# Patient Record
Sex: FEMALE | Race: WHITE | NOT HISPANIC OR LATINO
[De-identification: names, ages, dates, MRNs, and addresses within clinical notes are randomized per-mention and may not be internally consistent; named-entity substitution may affect disease eponyms.]

---
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
## 2018-03-08 ENCOUNTER — OTHER (OUTPATIENT)
Age: 18
End: 2018-03-08

## 2018-03-12 ENCOUNTER — OTHER (OUTPATIENT)
Age: 18
End: 2018-03-12

## 2018-03-19 ENCOUNTER — MEDICATION RENEWAL (OUTPATIENT)
Age: 18
End: 2018-03-19

## 2018-03-23 ENCOUNTER — APPOINTMENT (OUTPATIENT)
Dept: ENDOCRINOLOGY | Facility: CLINIC | Age: 18
End: 2018-03-23

## 2018-03-23 ENCOUNTER — OTHER (OUTPATIENT)
Age: 18
End: 2018-03-23

## 2018-06-12 ENCOUNTER — APPOINTMENT (OUTPATIENT)
Dept: PEDIATRICS | Facility: CLINIC | Age: 18
End: 2018-06-12
Payer: COMMERCIAL

## 2018-06-12 PROCEDURE — 86580 TB INTRADERMAL TEST: CPT

## 2018-08-07 ENCOUNTER — MEDICATION RENEWAL (OUTPATIENT)
Age: 18
End: 2018-08-07

## 2018-09-04 ENCOUNTER — MEDICATION RENEWAL (OUTPATIENT)
Age: 18
End: 2018-09-04

## 2018-09-04 RX ORDER — MOMETASONE FUROATE AND FORMOTEROL FUMARATE DIHYDRATE 200; 5 UG/1; UG/1
200-5 AEROSOL RESPIRATORY (INHALATION)
Qty: 1 | Refills: 3 | Status: DISCONTINUED | COMMUNITY
Start: 2017-12-28 | End: 2018-09-04

## 2018-09-05 ENCOUNTER — MEDICATION RENEWAL (OUTPATIENT)
Age: 18
End: 2018-09-05

## 2018-09-13 ENCOUNTER — MEDICATION RENEWAL (OUTPATIENT)
Age: 18
End: 2018-09-13

## 2018-12-17 ENCOUNTER — APPOINTMENT (OUTPATIENT)
Dept: PEDIATRIC PULMONARY CYSTIC FIB | Facility: CLINIC | Age: 18
End: 2018-12-17
Payer: COMMERCIAL

## 2018-12-17 ENCOUNTER — LABORATORY RESULT (OUTPATIENT)
Age: 18
End: 2018-12-17

## 2018-12-17 VITALS
TEMPERATURE: 98.1 F | BODY MASS INDEX: 21.34 KG/M2 | WEIGHT: 125 LBS | OXYGEN SATURATION: 98 % | HEART RATE: 77 BPM | HEIGHT: 64.37 IN | DIASTOLIC BLOOD PRESSURE: 73 MMHG | RESPIRATION RATE: 24 BRPM | SYSTOLIC BLOOD PRESSURE: 123 MMHG

## 2018-12-17 LAB
FERRITIN SERPL-MCNC: 55 NG/ML
TSH SERPL-ACNC: 4.15 UIU/ML

## 2018-12-17 PROCEDURE — 99214 OFFICE O/P EST MOD 30 MIN: CPT | Mod: 25

## 2018-12-17 PROCEDURE — 94010 BREATHING CAPACITY TEST: CPT

## 2018-12-17 NOTE — PHYSICAL EXAM
[Well Nourished] : well nourished [Well Developed] : well developed [Alert] : ~L alert [Active] : active [Normal Breathing Pattern] : normal breathing pattern [No Respiratory Distress] : no respiratory distress [No Allergic Shiners] : no allergic shiners [No Drainage] : no drainage [No Conjunctivitis] : no conjunctivitis [Tympanic Membranes Clear] : tympanic membranes were clear [Nasal Mucosa Non-Edematous] : nasal mucosa non-edematous [No Nasal Drainage] : no nasal drainage [No Polyps] : no polyps [No Sinus Tenderness] : no sinus tenderness [No Oral Pallor] : no oral pallor [No Oral Cyanosis] : no oral cyanosis [Non-Erythematous] : non-erythematous [No Exudates] : no exudates [No Postnasal Drip] : no postnasal drip [No Tonsillar Enlargement] : no tonsillar enlargement [Absence Of Retractions] : absence of retractions [Symmetric] : symmetric [Good Expansion] : good expansion [No Acc Muscle Use] : no accessory muscle use [Good aeration to bases] : good aeration to bases [Equal Breath Sounds] : equal breath sounds bilaterally [No Crackles] : no crackles [No Rhonchi] : no rhonchi [No Wheezing] : no wheezing [Normal Sinus Rhythm] : normal sinus rhythm [No Heart Murmur] : no heart murmur [Soft, Non-Tender] : soft, non-tender [No Hepatosplenomegaly] : no hepatosplenomegaly [Non Distended] : was not ~L distended [Abdomen Mass (___ Cm)] : no abdominal mass palpated [Full ROM] : full range of motion [No Clubbing] : no clubbing [Capillary Refill < 2 secs] : capillary refill less than two seconds [No Cyanosis] : no cyanosis [No Petechiae] : no petechiae [No Kyphoscoliosis] : no kyphoscoliosis [No Contractures] : no contractures [Alert and  Oriented] : alert and oriented [No Abnormal Focal Findings] : no abnormal focal findings [Normal Muscle Tone And Reflexes] : normal muscle tone and reflexes [No Birth Marks] : no birth marks [No Rashes] : no rashes [No Skin Lesions] : no skin lesions [FreeTextEntry4] : edematous erythematous nasal turbinates

## 2018-12-17 NOTE — REVIEW OF SYSTEMS
[NI] : Allergic [Nl] : Endocrine [Fatigue] : fatigue [Snoring] : snoring [Nasal Congestion] : nasal congestion [Immunizations are up to date] : Immunizations are up to date [Influenza Vaccine this Past Year] : Influenza vaccine this past year [FreeTextEntry3] : wears glasses [FreeTextEntry4] : colds are worse but occur every other month in the winter [FreeTextEntry6] : prolonged cough with cold [de-identified] : acne [FreeTextEntry1] : flu vax [id number]

## 2018-12-17 NOTE — HISTORY OF PRESENT ILLNESS
[Worsened] : have worsened [FreeTextEntry1] : Having more difficulty with running, hard workouts and long distances. \par Taking albuterol and atrovent prior to workouts seems to help. \par This is first full fall season patient has been running (injured in past years).\par Taking Advair - reluctant to try Dulera,. \par No antbiotics, no prednisone. \par \par \par One course of prednisone, one z pack. \par Taking zyrtec, flonase, montelukast.  No sinus rinse since spring. OTC eye drops helped with itchiness. \par Sleeping ok. \par Still on Advair - on lower dose. A little wheezier with long runs, but ok by and large. \par No ocughing at night. \par Anemic - hard to differentiate fatigue from shortness of breath sometimes. \par \par \par

## 2018-12-17 NOTE — END OF VISIT
[FreeTextEntry2] : I, Genny Edge MS RN  have acted as a scribe and documented the HPI information for Dr. Becker\par The HPI documentation completed by the scribe is an accurate record of both my words and actions. \par

## 2018-12-19 LAB — FREE T4-ESOTERIX: 1.21 NG/DL

## 2018-12-27 LAB
A ALTERNATA IGE QN: 8.38 KUA/L
A FUMIGATUS IGE QN: 0.77 KUA/L
C ALBICANS IGE QN: 0.69 KUA/L
C HERBARUM IGE QN: 0.35 KUA/L
CAT DANDER IGE QN: 2.97 KUA/L
COMMON RAGWEED IGE QN: 0.79 KUA/L
D FARINAE IGE QN: <0.1 KUA/L
D PTERONYSS IGE QN: <0.1 KUA/L
DEPRECATED A ALTERNATA IGE RAST QL: ABNORMAL
DEPRECATED A FUMIGATUS IGE RAST QL: ABNORMAL
DEPRECATED C ALBICANS IGE RAST QL: ABNORMAL
DEPRECATED C HERBARUM IGE RAST QL: ABNORMAL
DEPRECATED CAT DANDER IGE RAST QL: ABNORMAL
DEPRECATED COMMON RAGWEED IGE RAST QL: ABNORMAL
DEPRECATED D FARINAE IGE RAST QL: 0
DEPRECATED D PTERONYSS IGE RAST QL: 0
DEPRECATED DOG DANDER IGE RAST QL: NORMAL
DEPRECATED M RACEMOSUS IGE RAST QL: 0
DEPRECATED ROACH IGE RAST QL: 0
DEPRECATED TIMOTHY IGE RAST QL: ABNORMAL
DEPRECATED WHITE OAK IGE RAST QL: ABNORMAL
DOG DANDER IGE QN: 0.21 KUA/L
M RACEMOSUS IGE QN: <0.1 KUA/L
ROACH IGE QN: <0.1 KUA/L
TIMOTHY IGE QN: 3.43 KUA/L
WHITE OAK IGE QN: 21.7 KUA/L

## 2019-02-11 ENCOUNTER — APPOINTMENT (OUTPATIENT)
Dept: PULMONOLOGY | Facility: CLINIC | Age: 19
End: 2019-02-11

## 2019-04-30 ENCOUNTER — TRANSCRIPTION ENCOUNTER (OUTPATIENT)
Age: 19
End: 2019-04-30

## 2020-10-11 ENCOUNTER — APPOINTMENT (OUTPATIENT)
Dept: DISASTER EMERGENCY | Facility: CLINIC | Age: 20
End: 2020-10-11

## 2020-10-14 ENCOUNTER — APPOINTMENT (OUTPATIENT)
Dept: PULMONOLOGY | Facility: CLINIC | Age: 20
End: 2020-10-14
Payer: COMMERCIAL

## 2020-10-14 VITALS — DIASTOLIC BLOOD PRESSURE: 75 MMHG | HEART RATE: 63 BPM | RESPIRATION RATE: 16 BRPM | SYSTOLIC BLOOD PRESSURE: 118 MMHG

## 2020-10-14 VITALS
WEIGHT: 121 LBS | HEART RATE: 68 BPM | BODY MASS INDEX: 20.66 KG/M2 | HEIGHT: 64.3 IN | OXYGEN SATURATION: 98 % | TEMPERATURE: 98.2 F

## 2020-10-14 PROCEDURE — ZZZZZ: CPT

## 2020-10-14 PROCEDURE — 94726 PLETHYSMOGRAPHY LUNG VOLUMES: CPT

## 2020-10-14 PROCEDURE — 94060 EVALUATION OF WHEEZING: CPT

## 2020-10-14 PROCEDURE — 99203 OFFICE O/P NEW LOW 30 MIN: CPT | Mod: 25

## 2020-10-14 PROCEDURE — 94729 DIFFUSING CAPACITY: CPT

## 2020-10-14 RX ORDER — IPRATROPIUM BROMIDE AND ALBUTEROL 20; 100 UG/1; UG/1
20-100 SPRAY, METERED RESPIRATORY (INHALATION) TWICE DAILY
Qty: 1 | Refills: 5 | Status: ACTIVE | COMMUNITY
Start: 2020-10-14 | End: 1900-01-01

## 2020-10-14 NOTE — PHYSICAL EXAM
[No Acute Distress] : no acute distress [Normal Oropharynx] : normal oropharynx [Normal Appearance] : normal appearance [III] : Mallampati Class: III [No Neck Mass] : no neck mass [Normal S1, S2] : normal s1, s2 [Normal Rate/Rhythm] : normal rate/rhythm [Clear to Auscultation Bilaterally] : clear to auscultation bilaterally [No Resp Distress] : no resp distress [Benign] : benign [No Abnormalities] : no abnormalities [Normal Gait] : normal gait [No Clubbing] : no clubbing [No Edema] : no edema [Normal Color/ Pigmentation] : normal color/ pigmentation [No Focal Deficits] : no focal deficits [Oriented x3] : oriented x3

## 2020-10-15 RX ORDER — VORTIOXETINE 10 MG/1
10 TABLET, FILM COATED ORAL DAILY
Qty: 1 | Refills: 0 | Status: ACTIVE | COMMUNITY
Start: 2020-10-15

## 2020-10-15 RX ORDER — ALBUTEROL SULFATE 90 UG/1
108 (90 BASE) AEROSOL, METERED RESPIRATORY (INHALATION)
Qty: 3 | Refills: 3 | Status: COMPLETED | COMMUNITY
Start: 2017-08-21 | End: 2020-10-15

## 2020-10-15 RX ORDER — MOMETASONE 50 UG/1
50 SPRAY, METERED NASAL
Qty: 3 | Refills: 3 | Status: COMPLETED | COMMUNITY
Start: 2018-09-25 | End: 2020-10-15

## 2020-10-15 RX ORDER — LEVONORGESTREL 13.5 MG/1
13.5 INTRAUTERINE DEVICE INTRAUTERINE
Qty: 1 | Refills: 0 | Status: ACTIVE | COMMUNITY
Start: 2020-10-15

## 2020-10-15 RX ORDER — LEVOTHYROXINE SODIUM 50 UG/1
50 TABLET ORAL DAILY
Qty: 1 | Refills: 0 | Status: ACTIVE | COMMUNITY
Start: 2020-10-15

## 2020-10-15 RX ORDER — ESCITALOPRAM OXALATE 10 MG/1
10 TABLET, FILM COATED ORAL
Refills: 0 | Status: DISCONTINUED | COMMUNITY
End: 2020-10-15

## 2020-10-15 NOTE — PROCEDURE
[FreeTextEntry1] : 20F with well controlled but severe when off medication presents to establish care for asthma and also sleep apnea. \par PFT is normal\par Her asthma is adequately controlled on high dose ICS/LABA and singulair and albuterol. \par \par Feels well enough to run 50-60 miles per week, but requires pre-exercise alb/atrovent\par \par - continue Advair HFA, singulair\par -prn and pre-exercise bronchodilators -- Combivent Respimat prescribed (currently has separate)\par - Refer for sleep evaluation and non-PAP therapy for her MARIE. \par - Exercise stress test - pt is requesting, based on recommendations of pediatric pulm\par \par already received a flu shot

## 2020-10-15 NOTE — HISTORY OF PRESENT ILLNESS
[TextBox_4] : 20F with mod persistent asthma worsened with exercise here to establish care after seeing ped pulmonologist. \par Asthma for most of her life, initially exacerbated by allergens like pollen, cats, mold... but more recently worse with exercise. She has excellent exercise capacity, running 50-60 miles per week and engaging in competitive running and triathlons. \par \par She uses inhaled ICS/LABA as well as PRN albuterol. She uses montelukast for allergic component as rotates antihistamines throughout the year. She uses inhaled albuterol/ipratropium before and sometimes after runs but is able to run up to 10 miles at once without limitation. Wheezes if she does not use her inhalers. \par \par \par Sleep -- Had symptoms of poor sleep and daytime somnolence in college and was diagnosed with moderate MARIE (AHI unknown but reports she was told it was moderate) and prescribed PAP though she has been poorly adherent recently. \par Has better sleep and feels refreshed when she does use CPAP at night\par \par \par

## 2020-10-28 ENCOUNTER — APPOINTMENT (OUTPATIENT)
Dept: PULMONOLOGY | Facility: CLINIC | Age: 20
End: 2020-10-28
Payer: COMMERCIAL

## 2020-10-28 VITALS
SYSTOLIC BLOOD PRESSURE: 118 MMHG | TEMPERATURE: 98.8 F | WEIGHT: 121 LBS | RESPIRATION RATE: 15 BRPM | HEART RATE: 66 BPM | BODY MASS INDEX: 20.66 KG/M2 | DIASTOLIC BLOOD PRESSURE: 78 MMHG | HEIGHT: 64.3 IN

## 2020-10-28 PROCEDURE — 99244 OFF/OP CNSLTJ NEW/EST MOD 40: CPT

## 2020-10-28 PROCEDURE — 99072 ADDL SUPL MATRL&STAF TM PHE: CPT

## 2020-10-29 NOTE — REVIEW OF SYSTEMS
[EDS: ESS=____] : daytime somnolence: ESS=[unfilled] [Anemia] : anemia [History of Iron Deficiency] : history of iron deficiency [Depression] : depression [Anxious] : anxious [Negative] : Genitourinary [FreeTextEntry6] : stress fracture of foot from running

## 2020-10-29 NOTE — CONSULT LETTER
[Dear  ___] : Dear  [unfilled], [Consult Letter:] : I had the pleasure of evaluating your patient, [unfilled]. [Please see my note below.] : Please see my note below. [Consult Closing:] : Thank you very much for allowing me to participate in the care of this patient.  If you have any questions, please do not hesitate to contact me. [Sincerely,] : Sincerely, [FreeTextEntry3] : Cece Leon MD\par Pulmonary, Critical Care, and Sleep Medicine\par  of Medicine\par [employer] [facility]

## 2020-10-29 NOTE — ASSESSMENT
[FreeTextEntry1] : 20 year old female with moderate MARIE on CPAP referred for further management.\par Therapeutic and compliance data download reveals usage 26.7% of days with an average use of 4 hours and 12 minutes. Therapy based AHI is 5.7/hr on 5 - 15 cm H2O, with a 90th percentile pressure of 7.5 cm H2O. Will increase A-Flex to 2 cm H2O. The patient is experiencing benefit from CPAP and should continue to use. Advised increasing use. Trial of nasal interface to see if patient can tolerate better. If not, she will consider oral appliance therapy. List of dentists skilled in this regard given to patient. Once she has the device made, can perform a sleep study (2 nights) to test baseline and efficacy. Had a long discussion about Inspire therapy. Will try to optimize treatment with CPAP and oral appliance prior to pursuing Inspire.\par \par Follow up in 1 month for compliance check.

## 2020-10-29 NOTE — HISTORY OF PRESENT ILLNESS
[FreeTextEntry1] : This is a 20 year old female referred by Dr. Lisker for management of sleep apnea.\par \par HST (9/24/2019) showed an MINA Of 1.9. PSG (1/5/2020) showed an AHI of 24/hr. She was started on CPAP therapy which she feels improves her daytime sleepiness and sleep quality but she had not been using. She is using a full face mask. Had not tried a nasal interface. DME is [facility]. \par \par Comorbid medical conditions include exercise induced asthma, depression/anxiety, Hashimoto's thyroiditis, ADHD, iron deficiency anemia.\par

## 2020-10-29 NOTE — PHYSICAL EXAM
[General Appearance - Well Developed] : well developed [Normal Appearance] : normal appearance [Well Groomed] : well groomed [General Appearance - Well Nourished] : well nourished [No Deformities] : no deformities [General Appearance - In No Acute Distress] : no acute distress [Normal Conjunctiva] : the conjunctiva exhibited no abnormalities [IV] : IV [Neck Appearance] : the appearance of the neck was normal [Apical Impulse] : the apical impulse was normal [Heart Rate And Rhythm] : heart rate was normal and rhythm regular [Heart Sounds] : normal S1 and S2 [Heart Sounds Gallop] : no gallops [] : no respiratory distress [Involuntary Movements] : no involuntary movements were seen [FreeTextEntry1] : Wearing brace for stress fracture  [Nail Clubbing] : no clubbing of the fingernails [Cyanosis, Localized] : no localized cyanosis [Petechial Hemorrhages (___cm)] : no petechial hemorrhages [Nail Splinter Hemorrhages] : no splinter hemorrhages of the nails [Skin Color & Pigmentation] : normal skin color and pigmentation [Skin Turgor] : normal skin turgor [No Focal Deficits] : no focal deficits [Oriented To Time, Place, And Person] : oriented to person, place, and time [Impaired Insight] : insight and judgment were intact [Affect] : the affect was normal [Mood] : the mood was normal

## 2020-11-02 ENCOUNTER — NON-APPOINTMENT (OUTPATIENT)
Age: 20
End: 2020-11-02

## 2020-11-02 ENCOUNTER — APPOINTMENT (OUTPATIENT)
Dept: INTERNAL MEDICINE | Facility: CLINIC | Age: 20
End: 2020-11-02
Payer: COMMERCIAL

## 2020-11-02 VITALS
TEMPERATURE: 97.2 F | WEIGHT: 120 LBS | HEART RATE: 106 BPM | HEIGHT: 64.3 IN | BODY MASS INDEX: 20.49 KG/M2 | SYSTOLIC BLOOD PRESSURE: 106 MMHG | OXYGEN SATURATION: 98 % | DIASTOLIC BLOOD PRESSURE: 80 MMHG

## 2020-11-02 DIAGNOSIS — F32.9 ANXIETY DISORDER, UNSPECIFIED: ICD-10-CM

## 2020-11-02 DIAGNOSIS — F41.9 ANXIETY DISORDER, UNSPECIFIED: ICD-10-CM

## 2020-11-02 DIAGNOSIS — R94.31 ABNORMAL ELECTROCARDIOGRAM [ECG] [EKG]: ICD-10-CM

## 2020-11-02 DIAGNOSIS — L73.9 FOLLICULAR DISORDER, UNSPECIFIED: ICD-10-CM

## 2020-11-02 DIAGNOSIS — Z11.59 ENCOUNTER FOR SCREENING FOR OTHER VIRAL DISEASES: ICD-10-CM

## 2020-11-02 DIAGNOSIS — Z87.09 PERSONAL HISTORY OF OTHER DISEASES OF THE RESPIRATORY SYSTEM: ICD-10-CM

## 2020-11-02 DIAGNOSIS — Z97.5 PRESENCE OF (INTRAUTERINE) CONTRACEPTIVE DEVICE: ICD-10-CM

## 2020-11-02 DIAGNOSIS — R23.8 OTHER SKIN CHANGES: ICD-10-CM

## 2020-11-02 DIAGNOSIS — Z80.7 FAMILY HISTORY OF OTHER MALIGNANT NEOPLASMS OF LYMPHOID, HEMATOPOIETIC AND RELATED TISSUES: ICD-10-CM

## 2020-11-02 DIAGNOSIS — Z86.59 PERSONAL HISTORY OF OTHER MENTAL AND BEHAVIORAL DISORDERS: ICD-10-CM

## 2020-11-02 DIAGNOSIS — F90.8 ATTENTION-DEFICIT HYPERACTIVITY DISORDER, OTHER TYPE: ICD-10-CM

## 2020-11-02 DIAGNOSIS — R59.9 ENLARGED LYMPH NODES, UNSPECIFIED: ICD-10-CM

## 2020-11-02 DIAGNOSIS — Z83.438 FAMILY HISTORY OF OTHER DISORDER OF LIPOPROTEIN METABOLISM AND OTHER LIPIDEMIA: ICD-10-CM

## 2020-11-02 DIAGNOSIS — R79.89 OTHER SPECIFIED ABNORMAL FINDINGS OF BLOOD CHEMISTRY: ICD-10-CM

## 2020-11-02 DIAGNOSIS — Z86.2 PERSONAL HISTORY OF DISEASES OF THE BLOOD AND BLOOD-FORMING ORGANS AND CERTAIN DISORDERS INVOLVING THE IMMUNE MECHANISM: ICD-10-CM

## 2020-11-02 DIAGNOSIS — E06.3 OTHER SPECIFIED HYPOTHYROIDISM: ICD-10-CM

## 2020-11-02 DIAGNOSIS — Z11.3 ENCOUNTER FOR SCREENING FOR INFECTIONS WITH A PREDOMINANTLY SEXUAL MODE OF TRANSMISSION: ICD-10-CM

## 2020-11-02 DIAGNOSIS — Z80.3 FAMILY HISTORY OF MALIGNANT NEOPLASM OF BREAST: ICD-10-CM

## 2020-11-02 DIAGNOSIS — Z00.00 ENCOUNTER FOR GENERAL ADULT MEDICAL EXAMINATION W/OUT ABNORMAL FINDINGS: ICD-10-CM

## 2020-11-02 DIAGNOSIS — Z80.6 FAMILY HISTORY OF LEUKEMIA: ICD-10-CM

## 2020-11-02 DIAGNOSIS — Z84.2 FAMILY HISTORY OF OTHER DISEASES OF THE GENITOURINARY SYSTEM: ICD-10-CM

## 2020-11-02 DIAGNOSIS — J45.40 MODERATE PERSISTENT ASTHMA, UNCOMPLICATED: ICD-10-CM

## 2020-11-02 DIAGNOSIS — Z11.1 ENCOUNTER FOR SCREENING FOR RESPIRATORY TUBERCULOSIS: ICD-10-CM

## 2020-11-02 DIAGNOSIS — Z86.39 PERSONAL HISTORY OF OTHER ENDOCRINE, NUTRITIONAL AND METABOLIC DISEASE: ICD-10-CM

## 2020-11-02 DIAGNOSIS — N91.2 AMENORRHEA, UNSPECIFIED: ICD-10-CM

## 2020-11-02 DIAGNOSIS — Z11.4 ENCOUNTER FOR SCREENING FOR HUMAN IMMUNODEFICIENCY VIRUS [HIV]: ICD-10-CM

## 2020-11-02 DIAGNOSIS — E03.8 OTHER SPECIFIED HYPOTHYROIDISM: ICD-10-CM

## 2020-11-02 PROCEDURE — 93000 ELECTROCARDIOGRAM COMPLETE: CPT

## 2020-11-02 PROCEDURE — 99385 PREV VISIT NEW AGE 18-39: CPT | Mod: 25

## 2020-11-02 PROCEDURE — 99072 ADDL SUPL MATRL&STAF TM PHE: CPT

## 2020-11-02 RX ORDER — PSYLLIUM HUSK 0.4 G
CAPSULE ORAL
Refills: 0 | Status: ACTIVE | COMMUNITY

## 2020-11-02 RX ORDER — ALBUTEROL SULFATE 90 UG/1
108 (90 BASE) AEROSOL, METERED RESPIRATORY (INHALATION)
Refills: 0 | Status: ACTIVE | COMMUNITY

## 2020-11-02 RX ORDER — MAGNESIUM OXIDE/MAG AA CHELATE 300 MG
CAPSULE ORAL
Refills: 0 | Status: ACTIVE | COMMUNITY

## 2020-11-02 RX ORDER — MULTIVIT-MIN/IRON/FOLIC ACID/K 18-600-40
CAPSULE ORAL
Refills: 0 | Status: ACTIVE | COMMUNITY

## 2020-11-02 RX ORDER — FEXOFENADINE HCL 180 MG
180 TABLET ORAL
Refills: 0 | Status: ACTIVE | COMMUNITY

## 2020-11-02 RX ORDER — IPRATROPIUM BROMIDE 17 UG/1
17 AEROSOL, METERED RESPIRATORY (INHALATION)
Refills: 0 | Status: ACTIVE | COMMUNITY

## 2020-11-02 RX ORDER — GUARN/MA-HUANG/P.GIN/S.GINSENG
600-200 TABLET ORAL
Refills: 0 | Status: ACTIVE | COMMUNITY

## 2020-11-02 RX ORDER — VITAMIN K2 90 MCG
125 MCG CAPSULE ORAL
Refills: 0 | Status: ACTIVE | COMMUNITY

## 2020-11-02 RX ORDER — MUPIROCIN 2 G/100G
2 CREAM TOPICAL TWICE DAILY
Qty: 1 | Refills: 0 | Status: ACTIVE | COMMUNITY
Start: 2020-11-02 | End: 1900-01-01

## 2020-11-02 RX ORDER — FLUTICASONE PROPIONATE 50 UG/1
50 SPRAY, METERED NASAL
Refills: 0 | Status: ACTIVE | COMMUNITY

## 2020-11-02 NOTE — PHYSICAL EXAM
[No Acute Distress] : no acute distress [Well Nourished] : well nourished [Well Developed] : well developed [Well-Appearing] : well-appearing [Normal Sclera/Conjunctiva] : normal sclera/conjunctiva [PERRL] : pupils equal round and reactive to light [Normal Outer Ear/Nose] : the outer ears and nose were normal in appearance [Normal TMs] : both tympanic membranes were normal [No Lymphadenopathy] : no lymphadenopathy [Supple] : supple [No Respiratory Distress] : no respiratory distress  [No Accessory Muscle Use] : no accessory muscle use [Clear to Auscultation] : lungs were clear to auscultation bilaterally [Normal Rate] : normal rate  [Regular Rhythm] : with a regular rhythm [Normal S1, S2] : normal S1 and S2 [No Murmur] : no murmur heard [No Edema] : there was no peripheral edema [Soft] : abdomen soft [Non Tender] : non-tender [Non-distended] : non-distended [No Masses] : no abdominal mass palpated [Normal Bowel Sounds] : normal bowel sounds [Normal Supraclavicular Nodes] : no supraclavicular lymphadenopathy [Normal Axillary Nodes] : no axillary lymphadenopathy [Normal Posterior Cervical Nodes] : no posterior cervical lymphadenopathy [Normal Anterior Cervical Nodes] : no anterior cervical lymphadenopathy [Normal Inguinal Nodes] : no inguinal lymphadenopathy [Grossly Normal Strength/Tone] : grossly normal strength/tone [No Focal Deficits] : no focal deficits [Normal Gait] : normal gait [Normal Affect] : the affect was normal [Normal Insight/Judgement] : insight and judgment were intact [de-identified] : L thyroid-? nodule [de-identified] : mild erythema of hair follicles on legs

## 2020-11-02 NOTE — HEALTH RISK ASSESSMENT
[Yes] : Yes [2 - 4 times a month (2 pts)] : 2-4 times a month (2 points) [1 or 2 (0 pts)] : 1 or 2 (0 points) [Never (0 pts)] : Never (0 points) [No] : In the past 12 months have you used drugs other than those required for medical reasons? No [1] : 1) Little interest or pleasure doing things for several days (1) [2] : 2) Feeling down, depressed, or hopeless for more than half of the days (2) [HIV Test offered] : HIV Test offered [With Patient/Caregiver] : With Patient/Caregiver [Designated Healthcare Proxy] : Designated healthcare proxy [Relationship: ___] : Relationship: [unfilled] [] : No [[id number]] : 3 [[id number]] : 13 [de-identified] : had optometrist- 10/20 [de-identified] : seen dentist August [AdvancecareDate] : 10/20

## 2020-11-02 NOTE — PLAN
[FreeTextEntry1] : EKG- NSR 99 . LVH\par Will give 1 mo of meds for her ADHD and anxiety until she can see a psych\par explained to pt that letter for accommodation for [employer]- she needs to get from psych

## 2020-11-04 LAB
C TRACH RRNA SPEC QL NAA+PROBE: NOT DETECTED
N GONORRHOEA RRNA SPEC QL NAA+PROBE: NOT DETECTED
SOURCE AMPLIFICATION: NORMAL

## 2020-11-11 ENCOUNTER — OUTPATIENT (OUTPATIENT)
Dept: OUTPATIENT SERVICES | Facility: HOSPITAL | Age: 20
LOS: 1 days | End: 2020-11-11
Payer: COMMERCIAL

## 2020-11-11 ENCOUNTER — APPOINTMENT (OUTPATIENT)
Dept: ULTRASOUND IMAGING | Facility: HOSPITAL | Age: 20
End: 2020-11-11
Payer: COMMERCIAL

## 2020-11-11 DIAGNOSIS — R59.9 ENLARGED LYMPH NODES, UNSPECIFIED: ICD-10-CM

## 2020-11-11 DIAGNOSIS — Z01.818 ENCOUNTER FOR OTHER PREPROCEDURAL EXAMINATION: ICD-10-CM

## 2020-11-11 PROCEDURE — 76536 US EXAM OF HEAD AND NECK: CPT | Mod: 26

## 2020-11-11 PROCEDURE — 76536 US EXAM OF HEAD AND NECK: CPT

## 2020-11-12 ENCOUNTER — APPOINTMENT (OUTPATIENT)
Dept: DISASTER EMERGENCY | Facility: CLINIC | Age: 20
End: 2020-11-12

## 2020-11-13 LAB — SARS-COV-2 N GENE NPH QL NAA+PROBE: NOT DETECTED

## 2020-11-16 ENCOUNTER — APPOINTMENT (OUTPATIENT)
Dept: PULMONOLOGY | Facility: CLINIC | Age: 20
End: 2020-11-16
Payer: COMMERCIAL

## 2020-11-16 DIAGNOSIS — J45.990 EXERCISE INDUCED BRONCHOSPASM: ICD-10-CM

## 2020-11-16 PROCEDURE — 99072 ADDL SUPL MATRL&STAF TM PHE: CPT

## 2020-11-16 PROCEDURE — 94621 CARDIOPULM EXERCISE TESTING: CPT

## 2020-11-16 PROCEDURE — 94060 EVALUATION OF WHEEZING: CPT | Mod: 59

## 2020-11-20 ENCOUNTER — NON-APPOINTMENT (OUTPATIENT)
Age: 20
End: 2020-11-20

## 2020-12-01 ENCOUNTER — NON-APPOINTMENT (OUTPATIENT)
Age: 20
End: 2020-12-01

## 2020-12-10 ENCOUNTER — OUTPATIENT (OUTPATIENT)
Dept: OUTPATIENT SERVICES | Facility: HOSPITAL | Age: 20
LOS: 1 days | Discharge: HOME | End: 2020-12-10
Payer: COMMERCIAL

## 2020-12-11 PROCEDURE — 90792 PSYCH DIAG EVAL W/MED SRVCS: CPT

## 2020-12-15 ENCOUNTER — TRANSCRIPTION ENCOUNTER (OUTPATIENT)
Age: 20
End: 2020-12-15

## 2020-12-16 ENCOUNTER — APPOINTMENT (OUTPATIENT)
Dept: INTERNAL MEDICINE | Facility: CLINIC | Age: 20
End: 2020-12-16

## 2020-12-16 ENCOUNTER — INPATIENT (INPATIENT)
Facility: HOSPITAL | Age: 20
LOS: 0 days | Discharge: HOME | DRG: 981 | End: 2020-12-16
Attending: OBSTETRICS & GYNECOLOGY | Admitting: OBSTETRICS & GYNECOLOGY
Payer: COMMERCIAL

## 2020-12-16 VITALS
HEART RATE: 98 BPM | TEMPERATURE: 99 F | HEIGHT: 64 IN | DIASTOLIC BLOOD PRESSURE: 62 MMHG | OXYGEN SATURATION: 99 % | SYSTOLIC BLOOD PRESSURE: 105 MMHG | WEIGHT: 115.08 LBS

## 2020-12-16 VITALS
OXYGEN SATURATION: 100 % | SYSTOLIC BLOOD PRESSURE: 114 MMHG | DIASTOLIC BLOOD PRESSURE: 73 MMHG | HEART RATE: 81 BPM | RESPIRATION RATE: 18 BRPM | TEMPERATURE: 99 F

## 2020-12-16 DIAGNOSIS — K66.1 HEMOPERITONEUM: ICD-10-CM

## 2020-12-16 DIAGNOSIS — Z09 ENCOUNTER FOR FOLLOW-UP EXAMINATION AFTER COMPLETED TREATMENT FOR CONDITIONS OTHER THAN MALIGNANT NEOPLASM: ICD-10-CM

## 2020-12-16 LAB
ALBUMIN SERPL ELPH-MCNC: 3.7 G/DL — SIGNIFICANT CHANGE UP (ref 3.3–5)
ALP SERPL-CCNC: 42 U/L — SIGNIFICANT CHANGE UP (ref 40–120)
ALT FLD-CCNC: 14 U/L — SIGNIFICANT CHANGE UP (ref 10–45)
ANION GAP SERPL CALC-SCNC: 11 MMOL/L — SIGNIFICANT CHANGE UP (ref 5–17)
AST SERPL-CCNC: 18 U/L — SIGNIFICANT CHANGE UP (ref 10–40)
BASOPHILS # BLD AUTO: 0.03 K/UL — SIGNIFICANT CHANGE UP (ref 0–0.2)
BASOPHILS NFR BLD AUTO: 0.3 % — SIGNIFICANT CHANGE UP (ref 0–2)
BILIRUB SERPL-MCNC: 0.8 MG/DL — SIGNIFICANT CHANGE UP (ref 0.2–1.2)
BUN SERPL-MCNC: 10 MG/DL — SIGNIFICANT CHANGE UP (ref 7–23)
CALCIUM SERPL-MCNC: 8 MG/DL — LOW (ref 8.4–10.5)
CHLORIDE SERPL-SCNC: 105 MMOL/L — SIGNIFICANT CHANGE UP (ref 96–108)
CO2 SERPL-SCNC: 20 MMOL/L — LOW (ref 22–31)
CREAT SERPL-MCNC: 0.77 MG/DL — SIGNIFICANT CHANGE UP (ref 0.5–1.3)
EOSINOPHIL # BLD AUTO: 0.02 K/UL — SIGNIFICANT CHANGE UP (ref 0–0.5)
EOSINOPHIL NFR BLD AUTO: 0.2 % — SIGNIFICANT CHANGE UP (ref 0–6)
GLUCOSE SERPL-MCNC: 97 MG/DL — SIGNIFICANT CHANGE UP (ref 70–99)
HCG SERPL-ACNC: <2 MIU/ML — SIGNIFICANT CHANGE UP
HCT VFR BLD CALC: 34.2 % — LOW (ref 34.5–45)
HGB BLD-MCNC: 11.2 G/DL — LOW (ref 11.5–15.5)
IMM GRANULOCYTES NFR BLD AUTO: 0.6 % — SIGNIFICANT CHANGE UP (ref 0–1.5)
LYMPHOCYTES # BLD AUTO: 1.85 K/UL — SIGNIFICANT CHANGE UP (ref 1–3.3)
LYMPHOCYTES # BLD AUTO: 17 % — SIGNIFICANT CHANGE UP (ref 13–44)
MCHC RBC-ENTMCNC: 31.4 PG — SIGNIFICANT CHANGE UP (ref 27–34)
MCHC RBC-ENTMCNC: 32.7 GM/DL — SIGNIFICANT CHANGE UP (ref 32–36)
MCV RBC AUTO: 95.8 FL — SIGNIFICANT CHANGE UP (ref 80–100)
MONOCYTES # BLD AUTO: 0.75 K/UL — SIGNIFICANT CHANGE UP (ref 0–0.9)
MONOCYTES NFR BLD AUTO: 6.9 % — SIGNIFICANT CHANGE UP (ref 2–14)
NEUTROPHILS # BLD AUTO: 8.14 K/UL — HIGH (ref 1.8–7.4)
NEUTROPHILS NFR BLD AUTO: 75 % — SIGNIFICANT CHANGE UP (ref 43–77)
NRBC # BLD: 0 /100 WBCS — SIGNIFICANT CHANGE UP (ref 0–0)
PLATELET # BLD AUTO: 171 K/UL — SIGNIFICANT CHANGE UP (ref 150–400)
POTASSIUM SERPL-MCNC: 3.5 MMOL/L — SIGNIFICANT CHANGE UP (ref 3.5–5.3)
POTASSIUM SERPL-SCNC: 3.5 MMOL/L — SIGNIFICANT CHANGE UP (ref 3.5–5.3)
PROT SERPL-MCNC: 5.4 G/DL — LOW (ref 6–8.3)
RBC # BLD: 3.57 M/UL — LOW (ref 3.8–5.2)
RBC # FLD: 11.1 % — SIGNIFICANT CHANGE UP (ref 10.3–14.5)
SARS-COV-2 RNA SPEC QL NAA+PROBE: SIGNIFICANT CHANGE UP
SODIUM SERPL-SCNC: 136 MMOL/L — SIGNIFICANT CHANGE UP (ref 135–145)
WBC # BLD: 10.86 K/UL — HIGH (ref 3.8–10.5)
WBC # FLD AUTO: 10.86 K/UL — HIGH (ref 3.8–10.5)

## 2020-12-16 PROCEDURE — 99285 EMERGENCY DEPT VISIT HI MDM: CPT

## 2020-12-16 PROCEDURE — 76856 US EXAM PELVIC COMPLETE: CPT

## 2020-12-16 PROCEDURE — 76830 TRANSVAGINAL US NON-OB: CPT

## 2020-12-16 PROCEDURE — C9399: CPT

## 2020-12-16 PROCEDURE — 87635 SARS-COV-2 COVID-19 AMP PRB: CPT

## 2020-12-16 PROCEDURE — 93975 VASCULAR STUDY: CPT | Mod: 26

## 2020-12-16 PROCEDURE — 80053 COMPREHEN METABOLIC PANEL: CPT

## 2020-12-16 PROCEDURE — 76856 US EXAM PELVIC COMPLETE: CPT | Mod: 26,59

## 2020-12-16 PROCEDURE — 84702 CHORIONIC GONADOTROPIN TEST: CPT

## 2020-12-16 PROCEDURE — 76830 TRANSVAGINAL US NON-OB: CPT | Mod: 26

## 2020-12-16 PROCEDURE — 93975 VASCULAR STUDY: CPT

## 2020-12-16 PROCEDURE — 85025 COMPLETE CBC W/AUTO DIFF WBC: CPT

## 2020-12-16 PROCEDURE — 94640 AIRWAY INHALATION TREATMENT: CPT

## 2020-12-16 RX ORDER — BUDESONIDE AND FORMOTEROL FUMARATE DIHYDRATE 160; 4.5 UG/1; UG/1
2 AEROSOL RESPIRATORY (INHALATION) ONCE
Refills: 0 | Status: COMPLETED | OUTPATIENT
Start: 2020-12-16 | End: 2020-12-16

## 2020-12-16 RX ORDER — SIMETHICONE 80 MG/1
80 TABLET, CHEWABLE ORAL EVERY 6 HOURS
Refills: 0 | Status: DISCONTINUED | OUTPATIENT
Start: 2020-12-16 | End: 2020-12-16

## 2020-12-16 RX ORDER — HYDROMORPHONE HYDROCHLORIDE 2 MG/ML
0.5 INJECTION INTRAMUSCULAR; INTRAVENOUS; SUBCUTANEOUS
Refills: 0 | Status: DISCONTINUED | OUTPATIENT
Start: 2020-12-16 | End: 2020-12-16

## 2020-12-16 RX ORDER — SODIUM CHLORIDE 9 MG/ML
1000 INJECTION, SOLUTION INTRAVENOUS
Refills: 0 | Status: DISCONTINUED | OUTPATIENT
Start: 2020-12-16 | End: 2020-12-16

## 2020-12-16 RX ORDER — ONDANSETRON 8 MG/1
4 TABLET, FILM COATED ORAL ONCE
Refills: 0 | Status: COMPLETED | OUTPATIENT
Start: 2020-12-16 | End: 2020-12-16

## 2020-12-16 RX ORDER — OXYCODONE HYDROCHLORIDE 5 MG/1
5 TABLET ORAL EVERY 4 HOURS
Refills: 0 | Status: DISCONTINUED | OUTPATIENT
Start: 2020-12-16 | End: 2020-12-16

## 2020-12-16 RX ORDER — ACETAMINOPHEN 500 MG
2 TABLET ORAL
Qty: 0 | Refills: 0 | DISCHARGE

## 2020-12-16 RX ORDER — IBUPROFEN 200 MG
600 TABLET ORAL EVERY 6 HOURS
Refills: 0 | Status: DISCONTINUED | OUTPATIENT
Start: 2020-12-16 | End: 2020-12-16

## 2020-12-16 RX ORDER — ONDANSETRON 8 MG/1
8 TABLET, FILM COATED ORAL EVERY 8 HOURS
Refills: 0 | Status: DISCONTINUED | OUTPATIENT
Start: 2020-12-16 | End: 2020-12-16

## 2020-12-16 RX ORDER — ALBUTEROL 90 UG/1
2.5 AEROSOL, METERED ORAL ONCE
Refills: 0 | Status: COMPLETED | OUTPATIENT
Start: 2020-12-16 | End: 2020-12-16

## 2020-12-16 RX ORDER — IBUPROFEN 200 MG
1 TABLET ORAL
Qty: 0 | Refills: 0 | DISCHARGE

## 2020-12-16 RX ORDER — OXYCODONE HYDROCHLORIDE 5 MG/1
1 TABLET ORAL
Qty: 3 | Refills: 0
Start: 2020-12-16

## 2020-12-16 RX ORDER — ACETAMINOPHEN 500 MG
975 TABLET ORAL EVERY 6 HOURS
Refills: 0 | Status: DISCONTINUED | OUTPATIENT
Start: 2020-12-16 | End: 2020-12-16

## 2020-12-16 RX ADMIN — SODIUM CHLORIDE 125 MILLILITER(S): 9 INJECTION, SOLUTION INTRAVENOUS at 10:43

## 2020-12-16 RX ADMIN — OXYCODONE HYDROCHLORIDE 5 MILLIGRAM(S): 5 TABLET ORAL at 13:45

## 2020-12-16 RX ADMIN — ALBUTEROL 2.5 MILLIGRAM(S): 90 AEROSOL, METERED ORAL at 10:23

## 2020-12-16 RX ADMIN — OXYCODONE HYDROCHLORIDE 5 MILLIGRAM(S): 5 TABLET ORAL at 13:13

## 2020-12-16 RX ADMIN — SIMETHICONE 80 MILLIGRAM(S): 80 TABLET, CHEWABLE ORAL at 14:57

## 2020-12-16 RX ADMIN — ONDANSETRON 4 MILLIGRAM(S): 8 TABLET, FILM COATED ORAL at 15:50

## 2020-12-16 NOTE — ED PROVIDER NOTE - ATTENDING CONTRIBUTION TO CARE
Afebrile. Awake and Alert. Lungs CTA. Heart RRR. Abdomen soft, +RUQ and SP TTP, ND. CN II-XII grossly intact. Moves all extremities without lateralization.    Pt transferred in stable condition from [place] for ruptured hemorrhagic cyst with hemoperitoneum and near-syncope: Gyn c/c, HD monitoring

## 2020-12-16 NOTE — ASU DISCHARGE PLAN (ADULT/PEDIATRIC) - CALL YOUR DOCTOR IF YOU HAVE ANY OF THE FOLLOWING:
Bleeding that does not stop/Swelling that gets worse/Pain not relieved by Medications/Fever greater than (need to indicate Fahrenheit or Celsius)/Wound/Surgical Site with redness, or foul smelling discharge or pus/Numbness, tingling, color or temperature change to extremity/Unable to urinate/Excessive diarrhea/Inability to tolerate liquids or foods/Increased irritability or sluggishness

## 2020-12-16 NOTE — CONSULT NOTE ADULT - SUBJECTIVE AND OBJECTIVE BOX
Gyn Consult Note  MEHRAN RAIN  20y  Female [id number]    HPI:      Name of GYN Physician:     OBHx:    GYNHx: Denies fibroids, cysts, endometriosis, STI's, Abnormal pap smears     PAST MEDICAL & SURGICAL HISTORY:      Meds:     Allergies    No Known Allergies    Intolerances        FAMILY HISTORY:      Social:     Vital Signs Last 24 Hrs  T(C): 37.1 (16 Dec 2020 02:55), Max: 37.1 (16 Dec 2020 02:55)  T(F): 98.8 (16 Dec 2020 02:55), Max: 98.8 (16 Dec 2020 02:55)  HR: 98 (16 Dec 2020 02:55) (98 - 98)  BP: 105/62 (16 Dec 2020 02:55) (105/62 - 105/62)  SpO2: 99% (16 Dec 2020 02:55) (99% - 99%)    Physical Exam:   General: sitting comfortably in bed, NAD   CV: RRR  Lungs: CTAB  Back: No CVA tenderness  Abd: Soft, non-tender, non-distended.  Bowel sounds present.    : No bleeding on pad. External labia wnl. Uterus wnl, nontender. Adnexa non palpable b/l. No CMT. Cervix closed.   Speculum Exam: No active bleeding from os.  Physiologic discharge.    Ext: non-tender b/l, no edema     LABS:       RADIOLOGY & ADDITIONAL STUDIES:    A/P:    Manju Franklin PGY-2 Gyn Consult Note  MEHRAN ORDOÑEZ  20y  Female [id number]    20y F LMP 11/14 presenting to [facility] ED as transfer from [facility]. Patient reports acute onset abdominal pain after a 20 minute run this afternoon. She states the pain is primarily suprapubic. She described the pain as a continuous, sharp ache/stabbing pain and intermittently slightly worse. Initially, the pain was 9.5/10 in intensity.     Upon her initial presentation to [facility] she underwent a TAUS, which demonstrated: "There is a 5.1x4.7cm fundal fibroid. The right ovary measures 4.1x3.2x2.6cm. The left ovary measures 3.9x2.8x1.8cm. There is arterial flow to both ovaries. No adnexal masses are identified. No free fluid is noted."     Shortly after her ultrasound, Ms. Ordoñez reports persistent pain and a vasovagal episode while laying in bed during which she felt nauseous, dizzy and diaphretic. She states that the ED physicians gave her a 2L IVF bolus, and her symptoms resolved.     She then underwent a CTAP, which demonstrated: "Intermediate density fluid in the pelvis consistent with hemorrhage. This extends up the paracolic gutters bilaterally.... Large left adnexal mass estimated 6cm in diameter, with density consistent with internal hemorrhage. This is located superior to the uterus and slightly to the left of midline."       Patient does not have an OBGYN in [place].     GYNHx: Denies fibroids, cysts, endometriosis, STI's, Abnormal pap smears   PMHx:     PAST MEDICAL & SURGICAL HISTORY:      Meds:     Allergies    No Known Allergies    Intolerances        FAMILY HISTORY:      Social:     Vital Signs Last 24 Hrs  T(C): 37.1 (16 Dec 2020 02:55), Max: 37.1 (16 Dec 2020 02:55)  T(F): 98.8 (16 Dec 2020 02:55), Max: 98.8 (16 Dec 2020 02:55)  HR: 98 (16 Dec 2020 02:55) (98 - 98)  BP: 105/62 (16 Dec 2020 02:55) (105/62 - 105/62)  SpO2: 99% (16 Dec 2020 02:55) (99% - 99%)    Physical Exam:   General: sitting comfortably in bed, NAD   CV: RRR  Lungs: CTAB  Back: No CVA tenderness  Abd: Soft, non-tender, non-distended.  Bowel sounds present.    : No bleeding on pad. External labia wnl. Uterus wnl, nontender. Adnexa non palpable b/l. No CMT. Cervix closed.   Speculum Exam: No active bleeding from os.  Physiologic discharge.    Ext: non-tender b/l, no edema     LABS:       RADIOLOGY & ADDITIONAL STUDIES:    A/P:    Manju Franklin PGY-2 Gyn Consult Note  MEHRAN ORDOÑEZ  20y  Female [id number]    20y F LMP 11/14 presenting to [facility] ED as transfer from [facility]. Patient reports acute onset abdominal pain after a 20 minute run this afternoon. She states the pain is primarily suprapubic. She described the pain as a continuous, sharp ache/stabbing pain and intermittently slightly worse. Initially, the pain was 9.5/10 in intensity.     Upon her initial presentation to [place] she underwent a TAUS, which demonstrated: "There is a 5.1x4.7cm fundal fibroid. The right ovary measures 4.1x3.2x2.6cm. The left ovary measures 3.9x2.8x1.8cm. There is arterial flow to both ovaries. No adnexal masses are identified. No free fluid is noted."     Shortly after her ultrasound, Ms. Ordoñez reports persistent pain and a vasovagal episode while laying in bed during which she felt nauseous, dizzy and diaphretic. She states that the ED physicians gave her a 2L IVF bolus, and her symptoms resolved.     She then underwent a CTAP, which demonstrated: "Intermediate density fluid in the pelvis consistent with hemorrhage. This extends up the paracolic gutters bilaterally.... Large left adnexal mass estimated 6cm in diameter, with density consistent with internal hemorrhage. This is located superior to the uterus and slightly to the left of midline."     She reports receiving IV Tylenol and Toradol for pain while at [facility]. While there, her pain improved to 4-5/10 in intensity. As EMS arrived to transfer her to [facility], she reports a second vasovagal episode while attempting to transfer to the [facility]. She again received IVF, with symptom improvement.     Currently, she endorses 4-5/10 pain. She states that the pain is worse with movement. She denies current lightheadedness or dizziness.     She also notes several days of dark brown vaginal spotting, which is slightly heavier today. She states the spotting may be c/w her menstrual cycle, however she does not typically experience painful periods.     She reports 1 episode of postcoital bleeding (saturating 1/2 pad) approximately 2w ago.     Patient does not have an OBGYN in [place].     GYNHx: Denies fibroids, cysts, endometriosis, STI's. Has never had a Pap. IUD in place.   PMHx: Sleep apnea, asthma, hashimoto's   Meds: Synthroid 50, Adderal PRN, Allegra, Advir, Singular, Flonase, Multivitamin   Allergies: NKDA    Vital Signs Last 24 Hrs  T(C): 37.1 (16 Dec 2020 02:55), Max: 37.1 (16 Dec 2020 02:55)  T(F): 98.8 (16 Dec 2020 02:55), Max: 98.8 (16 Dec 2020 02:55)  HR: 98 (16 Dec 2020 02:55) (98 - 98)  BP: 105/62 (16 Dec 2020 02:55) (105/62 - 105/62)  SpO2: 99% (16 Dec 2020 02:55) (99% - 99%)    Physical Exam:   General: Resting in bed, NAD.   CV: RRR  Lungs: CTAB  Abd: Soft, non-distended. Diffusely tender to palpation, increased tenderness in LLQ/suprapubic. Voluntary guarding. No rebound.   : +Dark bleeding on pad. External labia wnl. Uterus wnl, nontender. +CMT.  Adnexa non palpable b/l. Cervix closed.   Speculum Exam: Cervix grossly wnl. No active bleeding from os.  Approximately 5cc dark blood in vaginal vault.     LABS:              11.2   10.86 )-----------( 171      ( 12-16 @ 04:00 )             34.2     CBC at [place]: 14.1/41.7 Gyn Consult Note  MEHRAN ORDOÑEZ  20y  Female [id number]    20y F LMP 11/14 presenting to [facility] ED as transfer from [facility]. Patient reports acute onset abdominal pain after a 20 minute run at 430PM this afternoon. She states the pain is primarily suprapubic. She described the pain as a continuous, sharp ache/stabbing pain and intermittently slightly worse. Initially, the pain was 9.5/10 in intensity, accompanied by nausea. No emesis.     Upon her initial presentation to [facility] she underwent a TAUS, which demonstrated: "There is a 5.1x4.7cm fundal fibroid. The right ovary measures 4.1x3.2x2.6cm. The left ovary measures 3.9x2.8x1.8cm. There is arterial flow to both ovaries. No adnexal masses are identified. No free fluid is noted."     Shortly after her ultrasound, Ms. Ordoñez reports persistent pain and a vasovagal episode while laying in bed during which she felt nauseous, dizzy and diaphoretic She states that the ED physicians gave her a 2L IVF bolus, and her symptoms resolved.     She then underwent a CTAP, which demonstrated: "Intermediate density fluid in the pelvis consistent with hemorrhage. This extends up the paracolic gutters bilaterally.... Large left adnexal mass estimated 6cm in diameter, with density consistent with internal hemorrhage. This is located superior to the uterus and slightly to the left of midline."     She reports receiving IV Tylenol and Toradol for pain while at [facility]. While there, her pain improved to 4-5/10 in intensity. As EMS arrived to transfer her to [facility], she reports a second vasovagal episode while attempting to transfer to the [facility]. She again received IVF, with symptom improvement.     Currently, she endorses 4-5/10 pain. She states that the pain is worse with movement. She denies current lightheadedness or dizziness.     She also notes several days of dark brown vaginal spotting, which is slightly heavier today. She states the spotting may be c/w her menstrual cycle, however she does not typically experience painful periods.     She reports 1 episode of postcoital bleeding (saturating 1/2 pad) approximately 2w ago.     Patient does not have an OBGYN in [place].     GYNHx: Denies fibroids, cysts, endometriosis, STI's. Has never had a Pap. IUD in place.   PMHx: Sleep apnea, asthma, hashimoto's   Meds: Synthroid 50, Adderal PRN, Allegra, Advir, Singular, Flonase, Multivitamin   Allergies: NKDA    Vital Signs Last 24 Hrs  T(C): 37.1 (16 Dec 2020 02:55), Max: 37.1 (16 Dec 2020 02:55)  T(F): 98.8 (16 Dec 2020 02:55), Max: 98.8 (16 Dec 2020 02:55)  HR: 98 (16 Dec 2020 02:55) (98 - 98)  BP: 105/62 (16 Dec 2020 02:55) (105/62 - 105/62)  SpO2: 99% (16 Dec 2020 02:55) (99% - 99%)    Physical Exam:   General: Resting in bed, NAD.   CV: RRR  Lungs: CTAB  Abd: Soft, non-distended. Diffusely tender to palpation, increased tenderness in LLQ/suprapubic. Voluntary guarding. No rebound.   : +Dark bleeding on pad. External labia wnl. Uterus wnl, nontender. +CMT.  Adnexa non palpable b/l. Cervix closed.   Speculum Exam: Cervix grossly wnl. No active bleeding from os.  Approximately 5cc dark blood in vaginal vault.     LABS:              11.2   10.86 )-----------( 171      ( 12-16 @ 04:00 )             34.2     CBC at [facility]: 14.1/41.7

## 2020-12-16 NOTE — ASU DISCHARGE PLAN (ADULT/PEDIATRIC) - ACTIVITY LEVEL
No excercise/No heavy lifting/No sports/gym/Weight bearing as tolerated/Nothing per rectum/Nothing per vagina/No tub baths/No douching/No tampons/No intercourse

## 2020-12-16 NOTE — ED PROVIDER NOTE - OBJECTIVE STATEMENT
19yo female with pmh hashimotos presenting as transfer from [facility] for concern for hemorrhagic cyst, hemoperitoneum.  Patient states after a run yesterday she developed acute onset suprapubic/ llq pain.  Pain persisted and went to [place], us unremarkable but CT with hemoperitoneum with concern for ruptured hemorrhagic cyst.  No fevers, nausea, vomiting.  Had 2 episodes presyncope prior to leaving [facility] at which point she became hypotensive but now improved, admits to some lightheadedness now.  Small vaginal bleeding, spotting.  Has IUD.

## 2020-12-16 NOTE — H&P ADULT - NSHPPHYSICALEXAM_GEN_ALL_CORE
Vital Signs Last 24 Hrs  T(C): 37.1 (16 Dec 2020 02:55), Max: 37.1 (16 Dec 2020 02:55)  T(F): 98.8 (16 Dec 2020 02:55), Max: 98.8 (16 Dec 2020 02:55)  HR: 98 (16 Dec 2020 02:55) (98 - 98)  BP: 105/62 (16 Dec 2020 02:55) (105/62 - 105/62)  SpO2: 99% (16 Dec 2020 02:55) (99% - 99%)    Physical Exam:   General: Resting in bed, NAD.   CV: RRR  Lungs: CTAB  Abd: Soft, non-distended. Diffusely tender to palpation, increased tenderness in LLQ/suprapubic. Voluntary guarding. No rebound.   : +Dark bleeding on pad. External labia wnl. Uterus wnl, nontender. +CMT.  Adnexa non palpable b/l. Cervix closed.   Speculum Exam: Cervix grossly wnl. No active bleeding from os.  Approximately 5cc dark blood in vaginal vault.

## 2020-12-16 NOTE — CHART NOTE - NSCHARTNOTEFT_GEN_A_CORE
Called to bedside to evaluate patient as patient crying and unable to be consoled. Pt denied pain. States that she was wheezing and did not get her" inhalers" today. Pt O2 sat 100% RR 20-24 ETco2 20-26 Pt is pink. No stridor noted .Pt sobbing and moderate amount of mucus noted in mouth.  Pt states she is wheezing, despite ascultation to the contrary. D/W Dr Mclaughlin. Pt ordered for albuterol hhn @ 1023. At no time did the patient to have any  evident respiratory compromise. Pt hr , O2 sat 100% on room air . RR max of 24 Etco2 28. 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 Possibly referring to Vocal chord dysfunction ( VCD), patient was provided with reassurance and supportive care and encouraged deep breathing exercises. Provide Nasal canula @ 2 lpm. Pt stated that nebulizer " didn't work " and " do you have anything stronger". Pt remains without stridor, wheezing. Pt asked for her Advair inhaler. Spoke to pharmacy at [facility] who confirmed pt on Advair 230/21.  Informed patient Advair was ordered. Therapeutic interchange provided patient with Symbicort. Pt did not want Symbicort. Clearly explained to patient that will call pharmacy to see if there was a way to get Advair. Unfortunately, pharmacy does not have actual Symbicort> Pt states she wants her own medication ( Advair). D/W patient that her family can bring the med in and will be checked by pharmacy and then she can take it. Pt verbalized understanding of plan and called parents to bring in Advair. Pt was amenable to plan. Currently pat asleep, resting comfortably O2 sat 100% on Room Air ETco2 38 RR 14 Hr 72.

## 2020-12-16 NOTE — H&P ADULT - ATTENDING COMMENTS
OB attg note    21yo G0 transferred from OSH for pelvic pain with +free fluid and possible hemorrhagic cyst on sono, now with concern for ruptured hemorrhagic cyst and possible ovarian torsion. Pt with hx of asthma on advair/singulair and Hashimoto's on synthroid, has IUD in situ. Had sudden on set midline pelvic pain with nausea, at outside hospital had TAUS that showed possible fibroid although CT showed possible hemorrhagic cyst with free fluid. While at OSH received toradol and morphine per pt, had 2 episodes of vasovagal/hypotension that resolved with IVH. Transferred to [facility] for GYN evaluation. Pt is AFVSS, abdominal exam with +guarding and LLQ ttp, mildly distended. Pt reports small movements are associated with significant abdominal discomfort and refused to go to the bathroom, used bedpan instead. TVUS here with enlarged L ovary 5.5x5cm with complex L ov cyst 4.3cm c/w hemorrhagic cyst, + free fluid. +Flow on b/l ovaries. However given enlarged L ovary with clinical suspicion for possible intermittent torsion OB attg note    19yo G0 transferred from OSH for pelvic pain with +free fluid and possible hemorrhagic cyst on sono, now with concern for ruptured hemorrhagic cyst and possible ovarian torsion. Pt with hx of asthma on advair/singulair and Hashimoto's on synthroid, has IUD in situ. Had sudden on set midline pelvic pain with nausea, at outside hospital had TAUS that showed possible fibroid although CT showed possible hemorrhagic cyst with free fluid. While at OSH received toradol and morphine per pt, had 2 episodes of vasovagal/hypotension that resolved with IVH. Transferred to [facility] for GYN evaluation. Pt is AFVSS, abdominal exam with +guarding and LLQ ttp, mildly distended. Pt reports small movements are associated with significant abdominal discomfort and refused to go to the bathroom, used bedpan instead. TVUS here with enlarged L ovary 5.5x5cm with complex L ov cyst 4.3cm c/w hemorrhagic cyst, + free fluid. +Flow on b/l ovaries. Hct 34. However given enlarged L ovary with clinical suspicion for possible intermittent torsion, recommend diagnostic laparoscopy, ovarian detorsion, possible ovarian cystectomy, possible salpingoophorectomy, any other indicated procedures. Discussed risks including but not limited to bleeding, infection, damage to surrounding organs, informed consent obtained. Discussed findings and plan of action with mother via phone. All questions answered, on call for OR.    Shira GARCIA

## 2020-12-16 NOTE — BRIEF OPERATIVE NOTE - NSICDXBRIEFPROCEDURE_GEN_ALL_CORE_FT
PROCEDURES:  Evacuation of hemoperitoneum 16-Dec-2020 09:47:34  Frankel, Robyn  Diagnostic laparoscopy 16-Dec-2020 09:47:22  Frankel, Robyn

## 2020-12-16 NOTE — ED PROVIDER NOTE - NS ED ROS FT
Constitutional: No fevers, no chills  ENMT: No sore throat, no congestion  Resp: No shortness of breath, no cough  Cardio: No chest pain, no palpitations, no peripheral edema, no syncope  GI: No abdominal pain, no nausea, no vomiting, no diarrhea  : No dysuria, no urinary frequency, + vaginal bleeding, + pelvic pain  MSK: No back pain, no neck pain  Skin: No rash, no lacerations, no bruising  Neuro: No headache, no numbness, no weakness

## 2020-12-16 NOTE — ED PROVIDER NOTE - CLINICAL SUMMARY MEDICAL DECISION MAKING FREE TEXT BOX
21yo female with pmh hashimotos presenting as transfer from [facility] for concern for hemorrhagic cyst, hemoperitoneum.   Repeat labs, hcg neg.  Repeat ultrasound, discuss with gyn.

## 2020-12-16 NOTE — ED ADULT NURSE NOTE - OBJECTIVE STATEMENT
20y old female PMH hashimoto's, sleep apnea, asthma, transfer from [place] for hemorraghic cyst. A&Ox3. As per patient she began having pan at 5pm last night, sudden severe cramping in suprapubic area, mild bleeding, now pain is diffuse throughout abdomen. Transfer for dx of left sided hemorraghic cyst, she endorses having near syncopal episode denies current dizziness/lightheadedness, LOC or head injury. She is well appearing,  gross neuro intact, lungs cta bilaterally, no difficulty speaking in complete sentences, s abdomen soft nondistended, tender to palpation throughout all 4 quadrants, skin intact. IV checked for patency, cardiac monitor in place, call bell with in reach.

## 2020-12-16 NOTE — PROGRESS NOTE ADULT - ASSESSMENT
A/P: 20y Female S/P diganostic laparoscopic evacuation of hemoperitoneum for ruptured hemorrhagic cyst  PAST MEDICAL & SURGICAL HISTORY:  asthma    -Continue routine care  -Analgesia PRN  -OOB with assistance x 2, then Ad Latanya  -Meds:   acetaminophen   Tablet .. 975 milliGRAM(s) Oral every 6 hours  HYDROmorphone  Injectable 0.5 milliGRAM(s) IV Push every 10 minutes PRN  ibuprofen  Tablet. 600 milliGRAM(s) Oral every 6 hours  lactated ringers. 1000 milliLiter(s) IV Continuous <Continuous>  lactated ringers. 1000 milliLiter(s) IV Continuous <Continuous>  ondansetron Injectable 8 milliGRAM(s) IV Push every 8 hours PRN  oxyCODONE    IR 5 milliGRAM(s) Oral every 4 hours PRN  simethicone 80 milliGRAM(s) Chew every 6 hours PRN

## 2020-12-16 NOTE — ASU DISCHARGE PLAN (ADULT/PEDIATRIC) - CARE PROVIDER_API CALL
Callie Yeager)  OBSGYN  General  [address], [address]  [place] [address]  Phone: [phone]  Fax: [phone]  Established Patient  Follow Up Time: 2 weeks

## 2020-12-16 NOTE — H&P ADULT - HISTORY OF PRESENT ILLNESS
20y F LMP 11/14 presenting to [facility] ED as transfer from [facility]. Patient reports acute onset abdominal pain after a 20 minute run at 430PM this afternoon. She states the pain is primarily suprapubic. She described the pain as a continuous, sharp ache/stabbing pain and intermittently slightly worse. Initially, the pain was 9.5/10 in intensity, accompanied by nausea. No emesis.     Upon her initial presentation to [place] she underwent a TAUS, which demonstrated: "There is a 5.1x4.7cm fundal fibroid. The right ovary measures 4.1x3.2x2.6cm. The left ovary measures 3.9x2.8x1.8cm. There is arterial flow to both ovaries. No adnexal masses are identified. No free fluid is noted."     Shortly after her ultrasound, Ms. Ordoñez reports persistent pain and a vasovagal episode while laying in bed during which she felt nauseous, dizzy and diaphoretic She states that the ED physicians gave her a 2L IVF bolus, and her symptoms resolved.     She then underwent a CTAP, which demonstrated: "Intermediate density fluid in the pelvis consistent with hemorrhage. This extends up the paracolic gutters bilaterally.... Large left adnexal mass estimated 6cm in diameter, with density consistent with internal hemorrhage. This is located superior to the uterus and slightly to the left of midline."     She reports receiving IV Tylenol and Toradol for pain while at [facility]. While there, her pain improved to 4-5/10 in intensity. As EMS arrived to transfer her to [facility], she reports a second vasovagal episode while attempting to transfer to the [facility]. She again received IVF, with symptom improvement.     Currently, she endorses 4-5/10 pain. She states that the pain is worse with movement. She denies current lightheadedness or dizziness.     She also notes several days of dark brown vaginal spotting, which is slightly heavier today. She states the spotting may be c/w her menstrual cycle, however she does not typically experience painful periods.     She reports 1 episode of postcoital bleeding (saturating 1/2 pad) approximately 2w ago.     Patient does not have an OBGYN in [place].     GYNHx: Denies fibroids, cysts, endometriosis, STI's. Has never had a Pap. IUD in place.   PMHx: Sleep apnea, asthma, hashimoto's   Meds: Synthroid 50, Adderal PRN, Allegra, Advir, Singular, Flonase, Multivitamin   Allergies: NKDA

## 2020-12-16 NOTE — ED ADULT NURSE NOTE - CHPI ED NUR SYMPTOMS NEG
no abdominal distension/no blood in stool/no burning urination/no chills/no diarrhea/no dysuria/no hematuria

## 2020-12-16 NOTE — ED PROVIDER NOTE - PHYSICAL EXAMINATION
General appearance: NAD, conversant, afebrile    Neck: Trachea midline; Full range of motion, supple   Pulm: CTA bl, normal respiratory effort and no intercostal retractions, normal work of breathing   CV: RRR, No murmurs, rubs, or gallops   Abdomen: Soft, tender llq, suprapubic, non-distended; no guarding or rebound   Extremities: No peripheral edema   Skin: Dry, normal temperature, turgor and texture; no rash   Psych: Appropriate affect, cooperative; alert and oriented to person, place and time

## 2020-12-16 NOTE — H&P ADULT - ASSESSMENT
21y/o F transferred from [place] with acute onset pelvic/LLQ pain, and adnexal cyst noted on ultrasound. Patient with exam and ultrasound findings concerning for possible ovarian torsion.    - Admit to GYN service    - Added onto OR schedule emergently    - COVID negative    - NPO    - LR@125cc/hr     patient seen and evaluated with Dr. Toy Babcock, PGY-2

## 2020-12-16 NOTE — PROGRESS NOTE ADULT - PROBLEM SELECTOR PLAN 1
-Diet-  Regular Advance as Toelrated  -   Due to void  -PAS     pt for discharg whenmeeting pacu criteria, ambulating, voiding, toleratign po

## 2020-12-17 PROCEDURE — 49000 EXPLORATION OF ABDOMEN: CPT | Mod: GC

## 2020-12-18 PROCEDURE — 99214 OFFICE O/P EST MOD 30 MIN: CPT

## 2020-12-21 ENCOUNTER — APPOINTMENT (OUTPATIENT)
Dept: PULMONOLOGY | Facility: CLINIC | Age: 20
End: 2020-12-21
Payer: COMMERCIAL

## 2020-12-21 DIAGNOSIS — G47.33 OBSTRUCTIVE SLEEP APNEA (ADULT) (PEDIATRIC): ICD-10-CM

## 2020-12-21 PROCEDURE — 99214 OFFICE O/P EST MOD 30 MIN: CPT | Mod: 95

## 2020-12-21 NOTE — ASSESSMENT
[FreeTextEntry1] : 20 year old female with moderate MARIE on CPAP here for a  follow up visit.\par Therapeutic and compliance data download reveals usage 53.3% of days with an average use of 4 hours and 55 minutes. Therapy based AHI is 4.7/hr on 5 - 15 cm H2O, with a 90th percentile pressure of 7.1 cm H2O. The patient is experiencing benefit from CPAP and should continue to use. She does tolerate the nasal interface better but is looking into pursing therapy with an oral appliance. She has an appt with Dr. Benitez upcoming. Once she has the device made, she will contact me and will perform a sleep study (2 nights) to test baseline and efficacy.

## 2020-12-21 NOTE — HISTORY OF PRESENT ILLNESS
[FreeTextEntry1] : This is a 20 year old female referred by Dr. Lisker for management of sleep apnea.\par \par HST (9/24/2019) showed an MINA of 2.5/hr. PSG (1/5/2020) showed an AHI of 24/hr. She is on APAP therapy. She has been trying to increase usage but finds it difficult, though she find the nasal cradle that she was given easier to tolerate. She does note improvement in sleep quality when she does use it. She has made an appt with Dr. Benitez for oral appliance therapy. DME is [facility] but in the process of transitioning to different DME company.\par \par Comorbid medical conditions include exercise induced asthma, depression/anxiety, Hashimoto's thyroiditis, ADHD, iron deficiency anemia. She had emergent laparoscopic surgery for an ovarian cyst last week. Developed wheezing post-procedure. Missed dosed of Advair prior.\par

## 2020-12-23 ENCOUNTER — NON-APPOINTMENT (OUTPATIENT)
Age: 20
End: 2020-12-23

## 2020-12-30 ENCOUNTER — APPOINTMENT (OUTPATIENT)
Dept: OBGYN | Facility: CLINIC | Age: 20
End: 2020-12-30
Payer: COMMERCIAL

## 2020-12-30 VITALS
WEIGHT: 117 LBS | BODY MASS INDEX: 19.97 KG/M2 | TEMPERATURE: 96.9 F | HEIGHT: 64 IN | SYSTOLIC BLOOD PRESSURE: 104 MMHG | DIASTOLIC BLOOD PRESSURE: 68 MMHG

## 2020-12-30 DIAGNOSIS — Z09 ENCOUNTER FOR FOLLOW-UP EXAMINATION AFTER COMPLETED TREATMENT FOR CONDITIONS OTHER THAN MALIGNANT NEOPLASM: ICD-10-CM

## 2020-12-30 PROCEDURE — 99024 POSTOP FOLLOW-UP VISIT: CPT

## 2021-01-11 DIAGNOSIS — F41.1 GENERALIZED ANXIETY DISORDER: ICD-10-CM

## 2021-01-14 DIAGNOSIS — F90.2 ATTENTION-DEFICIT HYPERACTIVITY DISORDER, COMBINED TYPE: ICD-10-CM

## 2021-01-15 ENCOUNTER — NON-APPOINTMENT (OUTPATIENT)
Age: 21
End: 2021-01-15

## 2021-01-15 PROCEDURE — 99214 OFFICE O/P EST MOD 30 MIN: CPT | Mod: 95

## 2021-01-24 LAB — SARS-COV-2 N GENE NPH QL NAA+PROBE: NOT DETECTED

## 2021-01-25 ENCOUNTER — TRANSCRIPTION ENCOUNTER (OUTPATIENT)
Age: 21
End: 2021-01-25

## 2021-01-29 ENCOUNTER — NON-APPOINTMENT (OUTPATIENT)
Age: 21
End: 2021-01-29

## 2021-02-05 ENCOUNTER — TRANSCRIPTION ENCOUNTER (OUTPATIENT)
Age: 21
End: 2021-02-05

## 2021-02-05 PROCEDURE — 99214 OFFICE O/P EST MOD 30 MIN: CPT | Mod: 95

## 2021-02-12 PROCEDURE — 99214 OFFICE O/P EST MOD 30 MIN: CPT | Mod: 95

## 2021-02-16 PROCEDURE — 90853 GROUP PSYCHOTHERAPY: CPT | Mod: 95

## 2021-02-23 PROCEDURE — 99214 OFFICE O/P EST MOD 30 MIN: CPT | Mod: 95

## 2021-02-23 PROCEDURE — 90853 GROUP PSYCHOTHERAPY: CPT | Mod: 95

## 2021-02-24 PROCEDURE — 90853 GROUP PSYCHOTHERAPY: CPT | Mod: 95

## 2021-03-01 PROCEDURE — 90834 PSYTX W PT 45 MINUTES: CPT | Mod: 95

## 2021-03-05 PROCEDURE — 90853 GROUP PSYCHOTHERAPY: CPT

## 2021-03-09 PROCEDURE — 99214 OFFICE O/P EST MOD 30 MIN: CPT | Mod: 95

## 2021-03-19 PROCEDURE — 90853 GROUP PSYCHOTHERAPY: CPT | Mod: 95

## 2021-03-23 PROCEDURE — 90853 GROUP PSYCHOTHERAPY: CPT | Mod: 95

## 2021-03-24 PROCEDURE — 90853 GROUP PSYCHOTHERAPY: CPT | Mod: 95

## 2021-03-25 PROCEDURE — 90853 GROUP PSYCHOTHERAPY: CPT | Mod: 95

## 2021-03-26 PROCEDURE — 90853 GROUP PSYCHOTHERAPY: CPT | Mod: 95

## 2021-03-30 PROCEDURE — 90853 GROUP PSYCHOTHERAPY: CPT | Mod: 95

## 2021-03-31 PROCEDURE — 90853 GROUP PSYCHOTHERAPY: CPT | Mod: 95

## 2021-04-01 PROCEDURE — 90853 GROUP PSYCHOTHERAPY: CPT | Mod: 95

## 2021-04-02 PROCEDURE — 90853 GROUP PSYCHOTHERAPY: CPT | Mod: 95

## 2021-04-06 PROCEDURE — 99214 OFFICE O/P EST MOD 30 MIN: CPT | Mod: 95

## 2021-04-06 PROCEDURE — 90836 PSYTX W PT W E/M 45 MIN: CPT | Mod: 95

## 2021-04-06 PROCEDURE — 90853 GROUP PSYCHOTHERAPY: CPT | Mod: 95

## 2021-04-09 PROCEDURE — 90853 GROUP PSYCHOTHERAPY: CPT | Mod: 95

## 2021-04-14 PROCEDURE — 90853 GROUP PSYCHOTHERAPY: CPT | Mod: 95

## 2021-04-20 PROCEDURE — 99214 OFFICE O/P EST MOD 30 MIN: CPT

## 2021-04-21 PROCEDURE — 90853 GROUP PSYCHOTHERAPY: CPT | Mod: 95

## 2021-04-23 PROCEDURE — 90853 GROUP PSYCHOTHERAPY: CPT | Mod: 95

## 2021-04-26 PROCEDURE — 90834 PSYTX W PT 45 MINUTES: CPT | Mod: 95

## 2021-04-27 PROCEDURE — 90853 GROUP PSYCHOTHERAPY: CPT | Mod: 95

## 2021-05-04 PROCEDURE — 99215 OFFICE O/P EST HI 40 MIN: CPT | Mod: 95

## 2021-05-06 ENCOUNTER — RX RENEWAL (OUTPATIENT)
Age: 21
End: 2021-05-06

## 2021-05-07 PROCEDURE — 90853 GROUP PSYCHOTHERAPY: CPT

## 2021-05-11 PROCEDURE — 90853 GROUP PSYCHOTHERAPY: CPT

## 2021-05-12 ENCOUNTER — EMERGENCY (EMERGENCY)
Facility: HOSPITAL | Age: 21
LOS: 1 days | Discharge: HOME | End: 2021-05-12
Attending: EMERGENCY MEDICINE
Payer: COMMERCIAL

## 2021-05-12 VITALS
HEART RATE: 70 BPM | RESPIRATION RATE: 18 BRPM | SYSTOLIC BLOOD PRESSURE: 161 MMHG | HEIGHT: 64 IN | WEIGHT: 115.08 LBS | DIASTOLIC BLOOD PRESSURE: 85 MMHG | TEMPERATURE: 98 F | OXYGEN SATURATION: 100 %

## 2021-05-12 VITALS
SYSTOLIC BLOOD PRESSURE: 121 MMHG | RESPIRATION RATE: 16 BRPM | DIASTOLIC BLOOD PRESSURE: 77 MMHG | OXYGEN SATURATION: 97 % | HEART RATE: 75 BPM | TEMPERATURE: 99 F

## 2021-05-12 DIAGNOSIS — Z98.890 OTHER SPECIFIED POSTPROCEDURAL STATES: Chronic | ICD-10-CM

## 2021-05-12 LAB
ALBUMIN SERPL ELPH-MCNC: 4.3 G/DL — SIGNIFICANT CHANGE UP (ref 3.3–5)
ALP SERPL-CCNC: 62 U/L — SIGNIFICANT CHANGE UP (ref 40–120)
ALT FLD-CCNC: 22 U/L — SIGNIFICANT CHANGE UP (ref 10–45)
ANION GAP SERPL CALC-SCNC: 11 MMOL/L — SIGNIFICANT CHANGE UP (ref 5–17)
APPEARANCE UR: CLEAR — SIGNIFICANT CHANGE UP
AST SERPL-CCNC: 23 U/L — SIGNIFICANT CHANGE UP (ref 10–40)
BASE EXCESS BLDV CALC-SCNC: 2.6 MMOL/L — HIGH (ref -2–2)
BASOPHILS # BLD AUTO: 0.06 K/UL — SIGNIFICANT CHANGE UP (ref 0–0.2)
BASOPHILS NFR BLD AUTO: 0.6 % — SIGNIFICANT CHANGE UP (ref 0–2)
BILIRUB SERPL-MCNC: 0.3 MG/DL — SIGNIFICANT CHANGE UP (ref 0.2–1.2)
BILIRUB UR-MCNC: NEGATIVE — SIGNIFICANT CHANGE UP
BUN SERPL-MCNC: 22 MG/DL — SIGNIFICANT CHANGE UP (ref 7–23)
CA-I SERPL-SCNC: 1.22 MMOL/L — SIGNIFICANT CHANGE UP (ref 1.12–1.3)
CALCIUM SERPL-MCNC: 9.3 MG/DL — SIGNIFICANT CHANGE UP (ref 8.4–10.5)
CHLORIDE BLDV-SCNC: 102 MMOL/L — SIGNIFICANT CHANGE UP (ref 96–108)
CHLORIDE SERPL-SCNC: 100 MMOL/L — SIGNIFICANT CHANGE UP (ref 96–108)
CO2 BLDV-SCNC: 30 MMOL/L — SIGNIFICANT CHANGE UP (ref 22–30)
CO2 SERPL-SCNC: 25 MMOL/L — SIGNIFICANT CHANGE UP (ref 22–31)
COLOR SPEC: COLORLESS — SIGNIFICANT CHANGE UP
CREAT SERPL-MCNC: 0.77 MG/DL — SIGNIFICANT CHANGE UP (ref 0.5–1.3)
DIFF PNL FLD: NEGATIVE — SIGNIFICANT CHANGE UP
EOSINOPHIL # BLD AUTO: 0.08 K/UL — SIGNIFICANT CHANGE UP (ref 0–0.5)
EOSINOPHIL NFR BLD AUTO: 0.9 % — SIGNIFICANT CHANGE UP (ref 0–6)
GAS PNL BLDV: 133 MMOL/L — LOW (ref 135–145)
GAS PNL BLDV: SIGNIFICANT CHANGE UP
GAS PNL BLDV: SIGNIFICANT CHANGE UP
GLUCOSE BLDV-MCNC: 97 MG/DL — SIGNIFICANT CHANGE UP (ref 70–99)
GLUCOSE SERPL-MCNC: 94 MG/DL — SIGNIFICANT CHANGE UP (ref 70–99)
GLUCOSE UR QL: NEGATIVE — SIGNIFICANT CHANGE UP
HCG SERPL-ACNC: <2 MIU/ML — SIGNIFICANT CHANGE UP
HCO3 BLDV-SCNC: 28 MMOL/L — SIGNIFICANT CHANGE UP (ref 21–29)
HCT VFR BLD CALC: 39.6 % — SIGNIFICANT CHANGE UP (ref 34.5–45)
HCT VFR BLDA CALC: 43 % — SIGNIFICANT CHANGE UP (ref 39–50)
HGB BLD CALC-MCNC: 14 G/DL — SIGNIFICANT CHANGE UP (ref 11.5–15.5)
HGB BLD-MCNC: 13 G/DL — SIGNIFICANT CHANGE UP (ref 11.5–15.5)
IMM GRANULOCYTES NFR BLD AUTO: 0.3 % — SIGNIFICANT CHANGE UP (ref 0–1.5)
KETONES UR-MCNC: NEGATIVE — SIGNIFICANT CHANGE UP
LACTATE BLDV-MCNC: 1 MMOL/L — SIGNIFICANT CHANGE UP (ref 0.7–2)
LEUKOCYTE ESTERASE UR-ACNC: NEGATIVE — SIGNIFICANT CHANGE UP
LYMPHOCYTES # BLD AUTO: 1.83 K/UL — SIGNIFICANT CHANGE UP (ref 1–3.3)
LYMPHOCYTES # BLD AUTO: 19.6 % — SIGNIFICANT CHANGE UP (ref 13–44)
MCHC RBC-ENTMCNC: 31.1 PG — SIGNIFICANT CHANGE UP (ref 27–34)
MCHC RBC-ENTMCNC: 32.8 GM/DL — SIGNIFICANT CHANGE UP (ref 32–36)
MCV RBC AUTO: 94.7 FL — SIGNIFICANT CHANGE UP (ref 80–100)
MONOCYTES # BLD AUTO: 0.68 K/UL — SIGNIFICANT CHANGE UP (ref 0–0.9)
MONOCYTES NFR BLD AUTO: 7.3 % — SIGNIFICANT CHANGE UP (ref 2–14)
NEUTROPHILS # BLD AUTO: 6.64 K/UL — SIGNIFICANT CHANGE UP (ref 1.8–7.4)
NEUTROPHILS NFR BLD AUTO: 71.3 % — SIGNIFICANT CHANGE UP (ref 43–77)
NITRITE UR-MCNC: NEGATIVE — SIGNIFICANT CHANGE UP
NRBC # BLD: 0 /100 WBCS — SIGNIFICANT CHANGE UP (ref 0–0)
OTHER CELLS CSF MANUAL: 10 ML/DL — LOW (ref 18–22)
PCO2 BLDV: 52 MMHG — HIGH (ref 35–50)
PH BLDV: 7.36 — SIGNIFICANT CHANGE UP (ref 7.35–7.45)
PH UR: 6.5 — SIGNIFICANT CHANGE UP (ref 5–8)
PLATELET # BLD AUTO: 203 K/UL — SIGNIFICANT CHANGE UP (ref 150–400)
PO2 BLDV: 29 MMHG — SIGNIFICANT CHANGE UP (ref 25–45)
POTASSIUM BLDV-SCNC: 3.7 MMOL/L — SIGNIFICANT CHANGE UP (ref 3.5–5.3)
POTASSIUM SERPL-MCNC: 4.1 MMOL/L — SIGNIFICANT CHANGE UP (ref 3.5–5.3)
POTASSIUM SERPL-SCNC: 4.1 MMOL/L — SIGNIFICANT CHANGE UP (ref 3.5–5.3)
PROT SERPL-MCNC: 6.7 G/DL — SIGNIFICANT CHANGE UP (ref 6–8.3)
PROT UR-MCNC: NEGATIVE — SIGNIFICANT CHANGE UP
RBC # BLD: 4.18 M/UL — SIGNIFICANT CHANGE UP (ref 3.8–5.2)
RBC # FLD: 11.9 % — SIGNIFICANT CHANGE UP (ref 10.3–14.5)
SAO2 % BLDV: 50 % — LOW (ref 67–88)
SODIUM SERPL-SCNC: 136 MMOL/L — SIGNIFICANT CHANGE UP (ref 135–145)
SP GR SPEC: 1.01 — SIGNIFICANT CHANGE UP (ref 1.01–1.02)
UROBILINOGEN FLD QL: NEGATIVE — SIGNIFICANT CHANGE UP
WBC # BLD: 9.32 K/UL — SIGNIFICANT CHANGE UP (ref 3.8–10.5)
WBC # FLD AUTO: 9.32 K/UL — SIGNIFICANT CHANGE UP (ref 3.8–10.5)

## 2021-05-12 PROCEDURE — 82947 ASSAY GLUCOSE BLOOD QUANT: CPT

## 2021-05-12 PROCEDURE — 76705 ECHO EXAM OF ABDOMEN: CPT | Mod: 26,59

## 2021-05-12 PROCEDURE — 76830 TRANSVAGINAL US NON-OB: CPT

## 2021-05-12 PROCEDURE — 84132 ASSAY OF SERUM POTASSIUM: CPT

## 2021-05-12 PROCEDURE — 82803 BLOOD GASES ANY COMBINATION: CPT

## 2021-05-12 PROCEDURE — 99284 EMERGENCY DEPT VISIT MOD MDM: CPT | Mod: 25

## 2021-05-12 PROCEDURE — 82330 ASSAY OF CALCIUM: CPT

## 2021-05-12 PROCEDURE — 84702 CHORIONIC GONADOTROPIN TEST: CPT

## 2021-05-12 PROCEDURE — 93975 VASCULAR STUDY: CPT | Mod: 26

## 2021-05-12 PROCEDURE — 85014 HEMATOCRIT: CPT

## 2021-05-12 PROCEDURE — 85025 COMPLETE CBC W/AUTO DIFF WBC: CPT

## 2021-05-12 PROCEDURE — 99285 EMERGENCY DEPT VISIT HI MDM: CPT

## 2021-05-12 PROCEDURE — 82565 ASSAY OF CREATININE: CPT

## 2021-05-12 PROCEDURE — 76705 ECHO EXAM OF ABDOMEN: CPT

## 2021-05-12 PROCEDURE — 85018 HEMOGLOBIN: CPT

## 2021-05-12 PROCEDURE — 76830 TRANSVAGINAL US NON-OB: CPT | Mod: 26

## 2021-05-12 PROCEDURE — 93975 VASCULAR STUDY: CPT

## 2021-05-12 PROCEDURE — 81003 URINALYSIS AUTO W/O SCOPE: CPT

## 2021-05-12 PROCEDURE — 96374 THER/PROPH/DIAG INJ IV PUSH: CPT

## 2021-05-12 PROCEDURE — 87086 URINE CULTURE/COLONY COUNT: CPT

## 2021-05-12 PROCEDURE — 83605 ASSAY OF LACTIC ACID: CPT

## 2021-05-12 PROCEDURE — 84295 ASSAY OF SERUM SODIUM: CPT

## 2021-05-12 PROCEDURE — 82435 ASSAY OF BLOOD CHLORIDE: CPT

## 2021-05-12 PROCEDURE — 80053 COMPREHEN METABOLIC PANEL: CPT

## 2021-05-12 RX ORDER — SODIUM CHLORIDE 9 MG/ML
1000 INJECTION, SOLUTION INTRAVENOUS ONCE
Refills: 0 | Status: COMPLETED | OUTPATIENT
Start: 2021-05-12 | End: 2021-05-12

## 2021-05-12 RX ORDER — ONDANSETRON 8 MG/1
1 TABLET, FILM COATED ORAL
Qty: 9 | Refills: 0
Start: 2021-05-12 | End: 2021-05-14

## 2021-05-12 RX ORDER — ONDANSETRON 8 MG/1
4 TABLET, FILM COATED ORAL ONCE
Refills: 0 | Status: COMPLETED | OUTPATIENT
Start: 2021-05-12 | End: 2021-05-12

## 2021-05-12 RX ADMIN — ONDANSETRON 4 MILLIGRAM(S): 8 TABLET, FILM COATED ORAL at 02:30

## 2021-05-12 RX ADMIN — SODIUM CHLORIDE 1000 MILLILITER(S): 9 INJECTION, SOLUTION INTRAVENOUS at 04:12

## 2021-05-12 NOTE — ED PROVIDER NOTE - PATIENT PORTAL LINK FT
You can access the FollowMyHealth Patient Portal offered by [facility] by registering at the following website: [email or web address]. By joining [facility]’s FollowMyHealth portal, you will also be able to view your health information using other applications (apps) compatible with our system.

## 2021-05-12 NOTE — ED ADULT NURSE REASSESSMENT NOTE - NS ED NURSE REASSESS COMMENT FT1
Pt back from US, resting comfortably in bed. VS as documented. Denies need for pain medication. Pending US results for dispo. Bed locked and lowered. Comfort and safety measures maintained.

## 2021-05-12 NOTE — ED PROVIDER NOTE - NSFOLLOWUPINSTRUCTIONS_ED_ALL_ED_FT
You were seen today for abdominal pain and were found to have a ruptured ovarian cyst. Please follow up with Dr. Callie Yeager in the next 1-2 weeks. Abstain from penetrative intercourse for 1 week. Don't run on 5/12/21, and take it easy after that. DOn't push yourself.  If you pass out or have worsening symptoms come back to the emergency room.

## 2021-05-12 NOTE — ED PROVIDER NOTE - NS ED ROS FT
General: denies fever, chills, weight loss/weight gain.  HENT: denies nasal congestion, sore throat  Eyes: denies visual changes, blurred vision  Neck: denies neck pain, neck swelling  CV: denies chest pain, palpitations  Resp: denies difficulty breathing, cough  Abdominal: see  HPI  GYN: no vaginal discharge or bleeding.   MSK: denies muscle aches, bony pain  Neuro: +dizziness,  Skin: denies rashes, cuts, bruises

## 2021-05-12 NOTE — ED PROVIDER NOTE - CLINICAL SUMMARY MEDICAL DECISION MAKING FREE TEXT BOX
21 year old female hx ruptured ovarian cyst, anxiety, depression presents for abdominal pain today. Exam concerning for ruptured cyst (given same sx with prior episodes) vs appendicitis. Pt does not want anti-emetics or pain meds at this time. US transvaginal and transabdominal, UA, hcg, re-assess.

## 2021-05-12 NOTE — ED PROVIDER NOTE - CARE PROVIDER_API CALL
Callie Yeager)  OBSGYN  General  [address], [address]  [place] [address]  Phone: [phone]  Fax: [phone]  Established Patient  Follow Up Time: 7-10 Days

## 2021-05-12 NOTE — ED PROVIDER NOTE - OBJECTIVE STATEMENT
21 year old female hx ruptured ovarian cyst, anxiety, depression presents for abdominal pain today. Patient was sitting and suddenly felt right sided abdominal pain, very severe. She fell to the floor and felt very lightheaded. She was then nauseous. This felt just like her previous ovarian cyst rupture. Patent denies fever, chills, dysuria, hematuria. LNMP April 8th. Not currently sexually active. No hx STIs.

## 2021-05-12 NOTE — ED ADULT NURSE NOTE - OBJECTIVE STATEMENT
21 year old female presents to ED via walk-in complaining of abdominal pain x1 day. PMH ruptured ovarian cyst in December requiring surgical intervention. Pt states pain she is having now is similar to that. States she was sitting on the computer when pain started, sudden, became dizzy/pre-syncopal, had chills and sharp pain in bilateral lower quadrants. Upon assessment A&O x4, ambulatory with steady gait and well appearing. Abdomen soft, non tender and non distended. Denies nausea and vomiting at this time. Denies chest pain, SOB, urinary symptoms. Denies blood in urine/stool. Last menstrual cycle x3 weeks ago. No history of STD/STI. IV placed, blood work sent to lab. Denies need for pain medication at this time. Medicated for nausea per MAR. Bed locked and lowered. Comfort and safety measures maintained.

## 2021-05-12 NOTE — ED PROVIDER NOTE - PHYSICAL EXAMINATION
General appearance: NAD, conversant, afebrile    Eyes: anicteric sclerae, moist conjunctivae; no lid-lag;   HENT: Atraumatic; oropharynx clear with moist mucous membranes   Neck: Trachea midline; Full range of motion, supple; no thyromegaly or lymphadenopathy   Pulm: Lungs clear to auscultation bilaterally, with normal respiratory effort and no intercostal retractions; normal work of breathing   CV: Regular Rhythm and Rate; Normal S1, S2; No murmurs, rubs, or gallops. 2+ peripheral pulses.   Abdomen: Soft, +Right lower pelvis pain   Extremities: No peripheral edema or extremity lymphadenopathy. 5/5 strength in all four extremities.   Skin: Normal temperature, turgor and texture; no rash, ulcers or subcutaneous nodules   Psych: Appropriate affect, cooperative; alert and oriented to person, place and time

## 2021-05-12 NOTE — ED PROVIDER NOTE - ATTENDING CONTRIBUTION TO CARE
Pt with sudden onset RLQ pain similar to previous time when she was dx with ruptured ovarian cyst, along with n/v. denies vag bleeding, urinary symptoms, fever. on exam, pt with soft abd, minimal tenderness in suprapubic and RLQ. unremarkable vitals. will obtain TVUS to assess for cyst and to r/o ovarian torsion or other acute pathology, ua, labs. lower suspicion for appy at this time given sudden onset and minimal tenderness .

## 2021-05-13 PROBLEM — F41.9 ANXIETY DISORDER, UNSPECIFIED: Chronic | Status: ACTIVE | Noted: 2021-05-12

## 2021-05-13 PROBLEM — F32.9 MAJOR DEPRESSIVE DISORDER, SINGLE EPISODE, UNSPECIFIED: Chronic | Status: ACTIVE | Noted: 2021-05-12

## 2021-05-13 PROBLEM — N83.209 UNSPECIFIED OVARIAN CYST, UNSPECIFIED SIDE: Chronic | Status: ACTIVE | Noted: 2021-05-12

## 2021-05-13 LAB
CULTURE RESULTS: SIGNIFICANT CHANGE UP
SPECIMEN SOURCE: SIGNIFICANT CHANGE UP

## 2021-05-14 ENCOUNTER — NON-APPOINTMENT (OUTPATIENT)
Age: 21
End: 2021-05-14

## 2021-05-14 PROCEDURE — 90853 GROUP PSYCHOTHERAPY: CPT | Mod: 95

## 2021-05-18 PROCEDURE — 99214 OFFICE O/P EST MOD 30 MIN: CPT | Mod: 95

## 2021-05-18 PROCEDURE — 90853 GROUP PSYCHOTHERAPY: CPT

## 2021-05-21 ENCOUNTER — APPOINTMENT (OUTPATIENT)
Dept: OBGYN | Facility: CLINIC | Age: 21
End: 2021-05-21
Payer: COMMERCIAL

## 2021-05-21 VITALS
BODY MASS INDEX: 19.97 KG/M2 | DIASTOLIC BLOOD PRESSURE: 78 MMHG | HEIGHT: 64 IN | SYSTOLIC BLOOD PRESSURE: 116 MMHG | WEIGHT: 117 LBS

## 2021-05-21 DIAGNOSIS — N83.209 UNSPECIFIED OVARIAN CYST, UNSPECIFIED SIDE: ICD-10-CM

## 2021-05-21 PROCEDURE — 99072 ADDL SUPL MATRL&STAF TM PHE: CPT

## 2021-05-21 PROCEDURE — 99213 OFFICE O/P EST LOW 20 MIN: CPT

## 2021-05-22 LAB
APTT BLD: 31.4 SEC
INR PPP: 0.96 RATIO
PT BLD: 11.3 SEC

## 2021-05-26 ENCOUNTER — NON-APPOINTMENT (OUTPATIENT)
Age: 21
End: 2021-05-26

## 2021-05-26 LAB — CYTOLOGY CVX/VAG DOC THIN PREP: ABNORMAL

## 2021-05-28 PROCEDURE — 90853 GROUP PSYCHOTHERAPY: CPT | Mod: 95

## 2021-06-01 PROCEDURE — 99214 OFFICE O/P EST MOD 30 MIN: CPT | Mod: 95

## 2021-06-05 ENCOUNTER — NON-APPOINTMENT (OUTPATIENT)
Age: 21
End: 2021-06-05

## 2021-06-09 ENCOUNTER — APPOINTMENT (OUTPATIENT)
Dept: OBGYN | Facility: CLINIC | Age: 21
End: 2021-06-09
Payer: COMMERCIAL

## 2021-06-09 VITALS
HEIGHT: 64 IN | BODY MASS INDEX: 20.14 KG/M2 | DIASTOLIC BLOOD PRESSURE: 76 MMHG | SYSTOLIC BLOOD PRESSURE: 115 MMHG | TEMPERATURE: 98.9 F | WEIGHT: 118 LBS

## 2021-06-09 DIAGNOSIS — N83.209 UNSPECIFIED OVARIAN CYST, UNSPECIFIED SIDE: ICD-10-CM

## 2021-06-09 DIAGNOSIS — Z30.430 ENCOUNTER FOR INSERTION OF INTRAUTERINE CONTRACEPTIVE DEVICE: ICD-10-CM

## 2021-06-09 PROCEDURE — 58300 INSERT INTRAUTERINE DEVICE: CPT

## 2021-06-09 PROCEDURE — 76830 TRANSVAGINAL US NON-OB: CPT

## 2021-06-09 PROCEDURE — 99072 ADDL SUPL MATRL&STAF TM PHE: CPT

## 2021-06-09 NOTE — PROCEDURE
[IUD Placement] : intrauterine device (IUD) placement [Time out performed] : Pre-procedure time out performed.  Patient's name, date of birth and procedure confirmed. [Consent Obtained] : Consent obtained [Prevention of Pregnancy] : prevention of pregnancy [Risks] : risks [Benefits] : benefits [Alternatives] : alternatives [Patient] : patient [Neg Pregnancy Test] : negative pregnancy test [No Premedication] : No premedication [Tenaculum] : Tenaculum [Easy Passage] : Easy passage [Sounded to ___ cm] : sounded to [unfilled] ~Ucm [Post Placement Transvag. US] : post placement transvaginal ultrasound [Joana IUD] : Joana IUD [Tolerated Well] : Patient tolerated the procedure well [No Complications] : No complications [Locate IUD] : locate IUD [History of Unprotected Keo] : no history of unprotected intercourse [FreeTextEntry3] : IUD at fundus [de-identified] : [id number] [de-identified] : 6/31/2021 [de-identified] : 6/2024

## 2021-06-14 LAB — VWF MULTIMERS PPP IA-ACNC: NORMAL

## 2021-06-22 ENCOUNTER — OUTPATIENT (OUTPATIENT)
Dept: OUTPATIENT SERVICES | Facility: HOSPITAL | Age: 21
LOS: 1 days | End: 2021-06-22
Payer: COMMERCIAL

## 2021-06-22 ENCOUNTER — APPOINTMENT (OUTPATIENT)
Dept: ULTRASOUND IMAGING | Facility: CLINIC | Age: 21
End: 2021-06-22
Payer: COMMERCIAL

## 2021-06-22 DIAGNOSIS — N83.209 UNSPECIFIED OVARIAN CYST, UNSPECIFIED SIDE: ICD-10-CM

## 2021-06-22 DIAGNOSIS — Z30.430 ENCOUNTER FOR INSERTION OF INTRAUTERINE CONTRACEPTIVE DEVICE: ICD-10-CM

## 2021-06-22 DIAGNOSIS — Z98.890 OTHER SPECIFIED POSTPROCEDURAL STATES: Chronic | ICD-10-CM

## 2021-06-22 PROCEDURE — 76856 US EXAM PELVIC COMPLETE: CPT

## 2021-06-22 PROCEDURE — 90853 GROUP PSYCHOTHERAPY: CPT | Mod: 95

## 2021-06-22 PROCEDURE — 76830 TRANSVAGINAL US NON-OB: CPT | Mod: 26

## 2021-06-22 PROCEDURE — 76856 US EXAM PELVIC COMPLETE: CPT | Mod: 26

## 2021-06-22 PROCEDURE — 76830 TRANSVAGINAL US NON-OB: CPT

## 2021-06-23 PROCEDURE — 90853 GROUP PSYCHOTHERAPY: CPT | Mod: 95

## 2021-06-24 PROCEDURE — 90853 GROUP PSYCHOTHERAPY: CPT | Mod: 95

## 2021-06-29 PROCEDURE — 90834 PSYTX W PT 45 MINUTES: CPT | Mod: 95

## 2021-07-02 PROCEDURE — 99214 OFFICE O/P EST MOD 30 MIN: CPT | Mod: 95

## 2021-07-02 PROCEDURE — 90853 GROUP PSYCHOTHERAPY: CPT | Mod: 95

## 2021-07-15 PROCEDURE — 90853 GROUP PSYCHOTHERAPY: CPT | Mod: 95

## 2021-07-20 PROCEDURE — 90834 PSYTX W PT 45 MINUTES: CPT | Mod: 95,59

## 2021-07-20 PROCEDURE — 90853 GROUP PSYCHOTHERAPY: CPT | Mod: 95

## 2021-07-27 PROCEDURE — 90853 GROUP PSYCHOTHERAPY: CPT | Mod: 95

## 2021-07-27 PROCEDURE — 90834 PSYTX W PT 45 MINUTES: CPT | Mod: 95,59

## 2021-07-30 PROCEDURE — 90833 PSYTX W PT W E/M 30 MIN: CPT | Mod: 95

## 2021-07-30 PROCEDURE — 90853 GROUP PSYCHOTHERAPY: CPT | Mod: 95

## 2021-07-30 PROCEDURE — 99214 OFFICE O/P EST MOD 30 MIN: CPT | Mod: 95

## 2021-08-13 PROCEDURE — 99214 OFFICE O/P EST MOD 30 MIN: CPT | Mod: 95

## 2021-12-29 ENCOUNTER — APPOINTMENT (OUTPATIENT)
Dept: OBGYN | Facility: CLINIC | Age: 21
End: 2021-12-29
Payer: COMMERCIAL

## 2021-12-29 VITALS
WEIGHT: 118 LBS | HEIGHT: 64 IN | DIASTOLIC BLOOD PRESSURE: 60 MMHG | BODY MASS INDEX: 20.14 KG/M2 | SYSTOLIC BLOOD PRESSURE: 100 MMHG

## 2021-12-29 DIAGNOSIS — Z30.09 ENCOUNTER FOR OTHER GENERAL COUNSELING AND ADVICE ON CONTRACEPTION: ICD-10-CM

## 2021-12-29 PROCEDURE — 99214 OFFICE O/P EST MOD 30 MIN: CPT

## 2021-12-29 RX ORDER — NORETHINDRONE ACETATE AND ETHINYL ESTRADIOL 1; 20 MG/1; UG/1
1-20 TABLET ORAL DAILY
Qty: 3 | Refills: 3 | Status: ACTIVE | COMMUNITY
Start: 2021-06-24 | End: 1900-01-01

## 2021-12-30 ENCOUNTER — TRANSCRIPTION ENCOUNTER (OUTPATIENT)
Age: 21
End: 2021-12-30

## 2021-12-30 LAB
AT III PPP CHRO-ACNC: 88 %
BASOPHILS # BLD AUTO: 0.05 K/UL
BASOPHILS NFR BLD AUTO: 1 %
C TRACH RRNA SPEC QL NAA+PROBE: NOT DETECTED
CANDIDA VAG CYTO: NOT DETECTED
EOSINOPHIL # BLD AUTO: 0.05 K/UL
EOSINOPHIL NFR BLD AUTO: 1 %
FERRITIN SERPL-MCNC: 84 NG/ML
G VAGINALIS+PREV SP MTYP VAG QL MICRO: NOT DETECTED
HCT VFR BLD CALC: 43 %
HGB BLD-MCNC: 14.3 G/DL
IMM GRANULOCYTES NFR BLD AUTO: 0.2 %
IRON SATN MFR SERPL: 41 %
IRON SERPL-MCNC: 159 UG/DL
LYMPHOCYTES # BLD AUTO: 1.78 K/UL
LYMPHOCYTES NFR BLD AUTO: 34.6 %
MAN DIFF?: NORMAL
MCHC RBC-ENTMCNC: 32.6 PG
MCHC RBC-ENTMCNC: 33.3 GM/DL
MCV RBC AUTO: 98.2 FL
MONOCYTES # BLD AUTO: 0.49 K/UL
MONOCYTES NFR BLD AUTO: 9.5 %
N GONORRHOEA RRNA SPEC QL NAA+PROBE: NOT DETECTED
NEUTROPHILS # BLD AUTO: 2.76 K/UL
NEUTROPHILS NFR BLD AUTO: 53.7 %
PLATELET # BLD AUTO: 251 K/UL
RBC # BLD: 4.38 M/UL
RBC # FLD: 11.4 %
SOURCE AMPLIFICATION: NORMAL
T VAGINALIS VAG QL WET PREP: NOT DETECTED
TIBC SERPL-MCNC: 389 UG/DL
TSH SERPL-ACNC: 1.43 UIU/ML
UIBC SERPL-MCNC: 229 UG/DL
WBC # FLD AUTO: 5.14 K/UL

## 2022-01-03 ENCOUNTER — TRANSCRIPTION ENCOUNTER (OUTPATIENT)
Age: 22
End: 2022-01-03

## 2022-01-03 LAB
DNA PLOIDY SPEC FC-IMP: NORMAL
PTR INTERP: NORMAL

## 2022-04-11 PROBLEM — Z11.59 SCREENING FOR VIRAL DISEASE: Status: ACTIVE | Noted: 2020-11-02

## 2023-08-23 NOTE — ED PROVIDER NOTE - CHILD ABUSE FACILITY
[facility] Pregnancy And Lactation Text: This medication is Pregnancy Category B and is considered safe during pregnancy. It is unknown if this medication is excreted in breast milk.

## 2024-05-20 ENCOUNTER — NON-APPOINTMENT (OUTPATIENT)
Age: 24
End: 2024-05-20

## 2024-05-21 RX ORDER — LEVONORGESTREL 13.5 MG/1
13.5 INTRAUTERINE DEVICE INTRAUTERINE
Qty: 1 | Refills: 0 | Status: ACTIVE | COMMUNITY
Start: 2024-05-21 | End: 1900-01-01

## 2024-06-21 ENCOUNTER — APPOINTMENT (OUTPATIENT)
Dept: OBGYN | Facility: CLINIC | Age: 24
End: 2024-06-21

## 2024-07-01 ENCOUNTER — NON-APPOINTMENT (OUTPATIENT)
Age: 24
End: 2024-07-01

## 2024-07-03 ENCOUNTER — NON-APPOINTMENT (OUTPATIENT)
Age: 24
End: 2024-07-03

## 2024-07-22 ENCOUNTER — APPOINTMENT (OUTPATIENT)
Dept: OBGYN | Facility: CLINIC | Age: 24
End: 2024-07-22

## 2024-07-30 PROBLEM — E06.3 HYPOTHYROIDISM DUE TO HASHIMOTO'S THYROIDITIS: Status: ACTIVE | Noted: 2020-10-15

## 2024-09-02 ENCOUNTER — NON-APPOINTMENT (OUTPATIENT)
Age: 24
End: 2024-09-02

## 2025-04-02 NOTE — CONSULT NOTE ADULT - CONSULT REQUESTED DATE/TIME
ANDRE 2, PHQ 0  Stable, continue Lexapro 5 mg daily  Recommend therapy  Denies SI HI  Follow-up in 3 months  
16-Dec-2020 21:00

## 2025-06-20 ENCOUNTER — NON-APPOINTMENT (OUTPATIENT)
Age: 25
End: 2025-06-20

## 2025-07-10 ENCOUNTER — NON-APPOINTMENT (OUTPATIENT)
Age: 25
End: 2025-07-10